# Patient Record
Sex: FEMALE | Race: WHITE | NOT HISPANIC OR LATINO | Employment: PART TIME
[De-identification: names, ages, dates, MRNs, and addresses within clinical notes are randomized per-mention and may not be internally consistent; named-entity substitution may affect disease eponyms.]

---

## 2017-05-03 ENCOUNTER — RECORDS - HEALTHEAST (OUTPATIENT)
Dept: ADMINISTRATIVE | Facility: OTHER | Age: 42
End: 2017-05-03

## 2017-05-03 LAB
BKR LAB AP ABNORMAL BLEEDING: NO
BKR LAB AP BIRTH CONTROL/HORMONES: NORMAL
BKR LAB AP CERVICAL APPEARANCE: NORMAL
BKR LAB AP GYN ADEQUACY: NORMAL
BKR LAB AP GYN INTERPRETATION: NORMAL
BKR LAB AP HPV REFLEX: NORMAL
BKR LAB AP LMP: NORMAL
BKR LAB AP PATIENT STATUS: NORMAL
BKR LAB AP PREVIOUS ABNORMAL: NORMAL
BKR LAB AP PREVIOUS NORMAL: 2012
HIGH RISK?: NO
PATH REPORT.COMMENTS IMP SPEC: NORMAL
RESULT FLAG (HE HISTORICAL CONVERSION): NORMAL

## 2017-07-08 ENCOUNTER — HEALTH MAINTENANCE LETTER (OUTPATIENT)
Age: 42
End: 2017-07-08

## 2017-10-02 ENCOUNTER — OFFICE VISIT (OUTPATIENT)
Dept: FAMILY MEDICINE | Facility: CLINIC | Age: 42
End: 2017-10-02
Payer: COMMERCIAL

## 2017-10-02 DIAGNOSIS — Z23 NEED FOR PROPHYLACTIC VACCINATION AND INOCULATION AGAINST INFLUENZA: Primary | ICD-10-CM

## 2017-10-02 PROCEDURE — 90471 IMMUNIZATION ADMIN: CPT

## 2017-10-02 PROCEDURE — 90686 IIV4 VACC NO PRSV 0.5 ML IM: CPT

## 2017-10-02 PROCEDURE — 99207 ZZC NO CHARGE NURSE ONLY: CPT

## 2017-10-02 NOTE — PROGRESS NOTES
Injectable Influenza Immunization Documentation    1.  Is the person to be vaccinated sick today?   No    2. Does the person to be vaccinated have an allergy to a component   of the vaccine?   No    3. Has the person to be vaccinated ever had a serious reaction   to influenza vaccine in the past?   No    4. Has the person to be vaccinated ever had Guillain-Barré syndrome?   No    Form completed by Klaudia Munoz CMA

## 2017-10-02 NOTE — MR AVS SNAPSHOT
"              After Visit Summary   10/2/2017    Jefferson Tineo    MRN: [id number]           Patient Information     Date Of Birth          1975        Visit Information        Provider Department      10/2/2017 10:00 AM Jada/Danni, Fl Amara [facility]        Today's Diagnoses     Need for prophylactic vaccination and inoculation against influenza    -  1       Follow-ups after your visit        Your next 10 appointments already scheduled     Oct 02, 2017 10:00 AM CDT   SHORT with Dm Kim   [facility] ([facility])    [address] [address]   [phone]              Who to contact     Normal or non-critical lab and imaging results will be communicated to you by MyChart, letter or phone within 4 business days after the clinic has received the results. If you do not hear from us within 7 days, please contact the clinic through MyChart or phone. If you have a critical or abnormal lab result, we will notify you by phone as soon as possible.  Submit refill requests through [employer] or call your pharmacy and they will forward the refill request to us. Please allow 3 business days for your refill to be completed.          If you need to speak with a  for additional information , please call: [phone]           Additional Information About Your Visit        [employer] Information     [employer] lets you send messages to your doctor, view your test results, renew your prescriptions, schedule appointments and more. To sign up, go to [email or web address] . Click on \"Log in\" on the left side of the screen, which will take you to the Welcome page. Then click on \"Sign up Now\" on the right side of the page.     You will be asked to enter the access code listed below, as well as some personal information. Please follow the directions to create your username and password.     Your access code is: [email or web address]  Expires: 12/31/2017  " 9:51 AM     Your access code will  in 90 days. If you need help or a new code, please call your [place] clinic or [phone].        Care EveryWhere ID     This is your Care EveryWhere ID. This could be used by other organizations to access your [place] medical records  [id number]         Blood Pressure from Last 3 Encounters:   16 92/63   16 98/55   09/16/15 102/70    Weight from Last 3 Encounters:   16 154 lb (69.9 kg)   09/16/15 153 lb (69.4 kg)   02/06/15 149 lb (67.6 kg)              We Performed the Following     FLU VAC, SPLIT VIRUS IM > 3 YO (QUADRIVALENT) [88868]     Vaccine Administration, Initial [95568]        Primary Care Provider Office Phone # Fax #    Teena Pandey [phone] [address] [facility] [address]        Equal Access to Services     [facility]: Hadii aad ku hadasho Soomaali, waaxda luqadaha, qaybta kaalmada adeegyada, waxay idiin haynazaninn francesco guthrie . So [facility] [phone].    ATENCIÓN: Si habla español, tiene a sibley disposición servicios gratuitos de asistencia lingüística. Llame al [phone].    We comply with applicable federal civil rights laws and [place] laws. We do not discriminate on the basis of race, color, national origin, age, disability, sex, sexual orientation, or gender identity.            Thank you!     Thank you for choosing [facility]  for your care. Our goal is always to provide you with excellent care. Hearing back from our patients is one way we can continue to improve our services. Please take a few minutes to complete the written survey that you may receive in the mail after your visit with us. Thank you!             Your Updated Medication List - Protect others around you: Learn how to safely use, store and throw away your medicines at www.disposemymeds.org.          This list is accurate as of: 10/2/17  9:51 AM.  Always use your most recent med list.                    Brand Name Dispense Instructions for use Diagnosis    VITAMIN D3 PO      Take 2,000 Units by mouth every other day

## 2018-09-12 ENCOUNTER — RECORDS - HEALTHEAST (OUTPATIENT)
Dept: LAB | Facility: CLINIC | Age: 43
End: 2018-09-12

## 2018-09-12 LAB
ALBUMIN SERPL-MCNC: 3.9 G/DL (ref 3.5–5)
ALP SERPL-CCNC: 40 U/L (ref 45–120)
ALT SERPL W P-5'-P-CCNC: 13 U/L (ref 0–45)
ANION GAP SERPL CALCULATED.3IONS-SCNC: 6 MMOL/L (ref 5–18)
AST SERPL W P-5'-P-CCNC: 14 U/L (ref 0–40)
BILIRUB SERPL-MCNC: 0.6 MG/DL (ref 0–1)
BUN SERPL-MCNC: 13 MG/DL (ref 8–22)
CALCIUM SERPL-MCNC: 9.8 MG/DL (ref 8.5–10.5)
CHLORIDE BLD-SCNC: 109 MMOL/L (ref 98–107)
CHOLEST SERPL-MCNC: 181 MG/DL
CO2 SERPL-SCNC: 26 MMOL/L (ref 22–31)
CREAT SERPL-MCNC: 0.77 MG/DL (ref 0.6–1.1)
ERYTHROCYTE [DISTWIDTH] IN BLOOD BY AUTOMATED COUNT: 13.4 % (ref 11–14.5)
FASTING STATUS PATIENT QL REPORTED: NORMAL
GFR SERPL CREATININE-BSD FRML MDRD: >60 ML/MIN/1.73M2
GLUCOSE BLD-MCNC: 76 MG/DL (ref 70–125)
HCT VFR BLD AUTO: 42.3 % (ref 35–47)
HDLC SERPL-MCNC: 78 MG/DL
HGB BLD-MCNC: 13.5 G/DL (ref 12–16)
LDLC SERPL CALC-MCNC: 89 MG/DL
MCH RBC QN AUTO: 29 PG (ref 27–34)
MCHC RBC AUTO-ENTMCNC: 31.9 G/DL (ref 32–36)
MCV RBC AUTO: 91 FL (ref 80–100)
PLATELET # BLD AUTO: 184 THOU/UL (ref 140–440)
PMV BLD AUTO: 11.3 FL (ref 8.5–12.5)
POTASSIUM BLD-SCNC: 4.5 MMOL/L (ref 3.5–5)
PROT SERPL-MCNC: 6.5 G/DL (ref 6–8)
RBC # BLD AUTO: 4.66 MILL/UL (ref 3.8–5.4)
SODIUM SERPL-SCNC: 141 MMOL/L (ref 136–145)
TRIGL SERPL-MCNC: 69 MG/DL
TSH SERPL DL<=0.005 MIU/L-ACNC: 1.39 UIU/ML (ref 0.3–5)
WBC: 4.6 THOU/UL (ref 4–11)

## 2018-10-31 ENCOUNTER — OFFICE VISIT (OUTPATIENT)
Dept: FAMILY MEDICINE | Facility: CLINIC | Age: 43
End: 2018-10-31
Payer: COMMERCIAL

## 2018-10-31 DIAGNOSIS — Z23 NEED FOR PROPHYLACTIC VACCINATION AND INOCULATION AGAINST INFLUENZA: Primary | ICD-10-CM

## 2018-10-31 PROCEDURE — 90471 IMMUNIZATION ADMIN: CPT

## 2018-10-31 PROCEDURE — 99207 ZZC NO CHARGE NURSE ONLY: CPT

## 2018-10-31 PROCEDURE — 90686 IIV4 VACC NO PRSV 0.5 ML IM: CPT

## 2018-10-31 NOTE — MR AVS SNAPSHOT
"              After Visit Summary   10/31/2018    Jefferson Tineo    MRN: [id number]           Patient Information     Date Of Birth          1975        Visit Information        Provider Department      10/31/2018 3:45 PM Cma/Lpn, Fl Ll [facility]        Today's Diagnoses     Need for prophylactic vaccination and inoculation against influenza    -  1       Follow-ups after your visit        Your next 10 appointments already scheduled     Oct 31, 2018  3:45 PM CDT   SHORT with Fl Ll Cma/Lpn   [facility]    [address] [address]   [phone]              Who to contact     Normal or non-critical lab and imaging results will be communicated to you by MyChart, letter or phone within 4 business days after the clinic has received the results. If you do not hear from us within 7 days, please contact the clinic through MyChart or phone. If you have a critical or abnormal lab result, we will notify you by phone as soon as possible.  Submit refill requests through [employer] or call your pharmacy and they will forward the refill request to us. Please allow 3 business days for your refill to be completed.          If you need to speak with a  for additional information , please call: [phone]           Additional Information About Your Visit        [employer] Information     [employer] lets you send messages to your doctor, view your test results, renew your prescriptions, schedule appointments and more. To sign up, go to [email or web address] . Click on \"Log in\" on the left side of the screen, which will take you to the Welcome page. Then click on \"Sign up Now\" on the right side of the page.     You will be asked to enter the access code listed below, as well as some personal information. Please follow the directions to create your username and password.     Your access code is: [email or web address]  Expires: 1/29/2019  " 2:14 PM     Your access code will  in 90 days. If you need help or a new code, please call your [place] clinic or [phone].        Care EveryWhere ID     This is your Care EveryWhere ID. This could be used by other organizations to access your [place] medical records  [id number]         Blood Pressure from Last 3 Encounters:   16 92/63   16 98/55   09/16/15 102/70    Weight from Last 3 Encounters:   16 154 lb (69.9 kg)   09/16/15 153 lb (69.4 kg)   02/06/15 149 lb (67.6 kg)              We Performed the Following     FLU VACCINE, SPLIT VIRUS, IM (QUADRIVALENT) [28443]- >3 YRS     Vaccine Administration, Initial [[id number]]        Primary Care Provider Office Phone # Fax #    Teena Pandey [phone] [address] [facility] [address]        Equal Access to Services     [facility]: Hadii aad ku hadasho Soomaali, waaxda luqadaha, qaybta kaalmada adeegyada, waxay idiin haynazaninn francesco guthrie . So [place] [phone].    ATENCIÓN: Si habla español, tiene a sibley disposición servicios gratuitos de asistencia lingüística. Llame al [phone].    We comply with applicable federal civil rights laws and [place] laws. We do not discriminate on the basis of race, color, national origin, age, disability, sex, sexual orientation, or gender identity.            Thank you!     Thank you for choosing [facility]  for your care. Our goal is always to provide you with excellent care. Hearing back from our patients is one way we can continue to improve our services. Please take a few minutes to complete the written survey that you may receive in the mail after your visit with us. Thank you!             Your Updated Medication List - Protect others around you: Learn how to safely use, store and throw away your medicines at www.disposemymeds.org.          This list is accurate as of 10/31/18  2:14 PM.  Always use your most recent med list.                    Brand Name Dispense Instructions for use Diagnosis    VITAMIN D3 PO      Take 2,000 Units by mouth every other day

## 2019-11-03 ENCOUNTER — HEALTH MAINTENANCE LETTER (OUTPATIENT)
Age: 44
End: 2019-11-03

## 2020-11-16 ENCOUNTER — HEALTH MAINTENANCE LETTER (OUTPATIENT)
Age: 45
End: 2020-11-16

## 2021-03-10 ENCOUNTER — RECORDS - HEALTHEAST (OUTPATIENT)
Dept: ADMINISTRATIVE | Facility: OTHER | Age: 46
End: 2021-03-10

## 2021-03-10 LAB
BKR LAB AP ABNORMAL BLEEDING: NO
BKR LAB AP BIRTH CONTROL/HORMONES: NORMAL
BKR LAB AP CERVICAL APPEARANCE: NORMAL
BKR LAB AP GYN ADEQUACY: NORMAL
BKR LAB AP GYN INTERPRETATION: NORMAL
BKR LAB AP HPV REFLEX: NORMAL
BKR LAB AP LMP: NORMAL
BKR LAB AP PATIENT STATUS: NORMAL
BKR LAB AP PREVIOUS ABNORMAL: NO
BKR LAB AP PREVIOUS NORMAL: NORMAL
HIGH RISK?: NO
PATH REPORT.COMMENTS IMP SPEC: NORMAL
RESULT FLAG (HE HISTORICAL CONVERSION): NORMAL

## 2021-04-02 ENCOUNTER — IMMUNIZATION (OUTPATIENT)
Dept: NURSING | Facility: CLINIC | Age: 46
End: 2021-04-02
Payer: COMMERCIAL

## 2021-04-02 PROCEDURE — 0001A PR COVID VAC PFIZER DIL RECON 30 MCG/0.3 ML IM: CPT

## 2021-04-02 PROCEDURE — 91300 PR COVID VAC PFIZER DIL RECON 30 MCG/0.3 ML IM: CPT

## 2021-04-23 ENCOUNTER — IMMUNIZATION (OUTPATIENT)
Dept: NURSING | Facility: CLINIC | Age: 46
End: 2021-04-23
Attending: INTERNAL MEDICINE
Payer: COMMERCIAL

## 2021-04-23 PROCEDURE — 0002A PR COVID VAC PFIZER DIL RECON 30 MCG/0.3 ML IM: CPT

## 2021-04-23 PROCEDURE — 91300 PR COVID VAC PFIZER DIL RECON 30 MCG/0.3 ML IM: CPT

## 2021-09-12 ENCOUNTER — HEALTH MAINTENANCE LETTER (OUTPATIENT)
Age: 46
End: 2021-09-12

## 2021-09-15 ENCOUNTER — APPOINTMENT (OUTPATIENT)
Dept: GENERAL RADIOLOGY | Facility: CLINIC | Age: 46
End: 2021-09-15
Attending: PHYSICIAN ASSISTANT
Payer: COMMERCIAL

## 2021-09-15 ENCOUNTER — HOSPITAL ENCOUNTER (EMERGENCY)
Facility: CLINIC | Age: 46
Discharge: HOME | End: 2021-09-15
Attending: PHYSICIAN ASSISTANT | Admitting: PHYSICIAN ASSISTANT
Payer: COMMERCIAL

## 2021-09-15 VITALS
BODY MASS INDEX: 23.52 KG/M2 | RESPIRATION RATE: 16 BRPM | WEIGHT: 137 LBS | OXYGEN SATURATION: 97 % | DIASTOLIC BLOOD PRESSURE: 83 MMHG | TEMPERATURE: 97 F | SYSTOLIC BLOOD PRESSURE: 125 MMHG | HEART RATE: 104 BPM

## 2021-09-15 DIAGNOSIS — S93.401A RIGHT ANKLE SPRAIN: ICD-10-CM

## 2021-09-15 DIAGNOSIS — S99.911A ANKLE INJURY, RIGHT, INITIAL ENCOUNTER: ICD-10-CM

## 2021-09-15 PROCEDURE — 99213 OFFICE O/P EST LOW 20 MIN: CPT | Performed by: PHYSICIAN ASSISTANT

## 2021-09-15 PROCEDURE — G0463 HOSPITAL OUTPT CLINIC VISIT: HCPCS | Performed by: PHYSICIAN ASSISTANT

## 2021-09-15 PROCEDURE — 73630 X-RAY EXAM OF FOOT: CPT | Mod: RT

## 2021-09-15 PROCEDURE — 73610 X-RAY EXAM OF ANKLE: CPT | Mod: RT

## 2021-09-15 ASSESSMENT — ENCOUNTER SYMPTOMS
NUMBNESS: 0
WEAKNESS: 0

## 2021-09-16 NOTE — ED TRIAGE NOTES
Walking her dog, dog pulled and foot stayed planted while right leg went laterally  C/o right ankle pain

## 2021-09-16 NOTE — ED PROVIDER NOTES
History     Chief Complaint   Patient presents with     Ankle Pain     HPI    Jefferson Tineo is a 46 year old female who sustained a right ankle injury 6 days ago.   Mechanism of injury: twisted.   Immediate symptoms: immediate pain, immediate swelling, was able to bear weight directly after injury, no deformity was noted by the patient.   Symptoms have been sudden since that time.   Prior history of related problems: Positive ankle sprains in the past but no surgical treatment or fractures.  Patient states lateral ankle and foot pain with bruising, swelling.  No knee pain.  No numbness or tingling.  No skin abrasions or lacerations.  No redness or decreased range of motion in the ankle.    Patient has been icing, elevating, Ace wrap and resting with minimal improvement.  She has not taken any Tylenol or ibuprofen over-the-counter for the pain.    Problem list, Medication list, Allergies, and Medical/Social/Surgical histories reviewed in EPIC and updated as appropriate.      Allergies:  No Known Allergies    Problem List:    Patient Active Problem List    Diagnosis Date Noted     Surveillance of previously prescribed intrauterine contraceptive device 11/22/2011     Priority: Medium     Mild recurrent major depression (H) 05/03/2011     Priority: Medium     CARDIOVASCULAR SCREENING; LDL GOAL LESS THAN 160 10/31/2010     Priority: Medium     Mood change 08/13/2008     Priority: Medium     External hemorrhoids 01/22/2008     Priority: Medium     Problem list name updated by automated process. Provider to review       Generalized anxiety disorder 07/30/2007     Priority: Medium     Stable without med 10/08  tried Zoloft, Celexa, Cymbalta and Paxil. Zoloft seemed to increase her suicidal ideation  4/09 restarted meds       Encounter for other general counseling or advice on contraception 07/24/2007     Priority: Medium     Diagnosis updated by automated process. Provider to review and confirm.       Tachycardia       Priority: Medium     Negative EKG, Holter, ECHO  Problem list name updated by automated process. Provider to review       Bell's palsy 09/03/2006     Priority: Medium     WRIST PAIN 05/05/2005     Priority: Medium     May 5, 2005 - RECENT ONSET ON WRIST PAIN W/O ETIOLOGY OR INJURY.  NO OTHER JOINTS INVOLVED. NEGATIVE EXAM AND XRAY.  REASSURRANCE AND MONITOR.  SPLINT GIVEN.  ANTIINFLAMMATORY DOSES OF NSAIDS.  If new, worsening or persistent symptoms, the patient is to call or return for a recheck.         Health Care Home 08/14/2013     Priority: Low     EMERGENCY CARE PLAN  August 14, 2013: No current Care Coordination follow up planned. Please refer if Care Coordination services are needed.    Presenting Problem Signs and Symptoms Treatment Plan   Questions or concerns   during clinic hours   I will call my clinic directly:  [address] [address]  [phone].    Questions or concerns outside clinic hours   I will call the 24 hour nurse line at   [phone] or [phone].   Need to schedule an appointment   I will call the 24 hour scheduling team at [phone] or my clinic directly at [phone].    Same day treatment     I will call my clinic first, nurse line if after hours, urgent care and express care if needed.   Clinic care coordination services (regular clinic hours)     I will call a clinic care coordinator directly:     Jose A Moy RN  Mon, Tues, Fri - [phone]  Wed, Thurs - [phone]    Mireille Naylor :    [phone]    Or call my clinic at [phone] and ask to speak with care coordination.   Crisis Services: Behavioral or Mental Health  [facility] 24 Hour Phone Line  [phone]    [facility] 24 Hour Crisis Services  [phone]    [facility] (Behavioral Health Providers) Network [phone]    [facility]   [phone]       Emergency treatment -- Immediately    CAll 911             Past Medical History:    Past  Medical History:   Diagnosis Date     Depressive disorder      Female infertility of unspecified origin      Unspecified spontaneous  without mention of complication        Past Surgical History:    Past Surgical History:   Procedure Laterality Date     APPENDECTOMY       Breast biopsy-benign  1999     BREAST SURGERY       GYN SURGERY       LAPAROSCOPIC APPENDECTOMY N/A 2016    Procedure: LAPAROSCOPIC APPENDECTOMY;  Surgeon: Mikael Weaver MD;  Location: [facility] OR     Left salpingo-oophorectomy, L 7cm ovarian cyst  2000     TONSILLECTOMY  1986       Family History:    Family History   Problem Relation Age of Onset     Alcohol/Drug Mother         alcohol     Depression Mother      Heart Disease Paternal Grandfather         CHF     Hypertension Paternal Grandfather      Cancer Paternal Grandfather      Prostate Cancer Paternal Grandfather      Diabetes Paternal Grandfather         Type II     Gastrointestinal Disease Father         ulcer     Hypertension Father      Depression Father      Respiratory Father 62        emphesma     Hyperlipidemia Father      Other Cancer Father      C.A.D. Paternal Grandmother          77MI     Depression Paternal Grandmother      Alzheimer Disease Paternal Grandmother      Depression Sister      C.A.D. Maternal Grandmother         MI     Diabetes Maternal Grandmother      Depression Sister      Heart Disease Paternal Aunt         P Uncles and P cousins with SVT, and structural heart disease     Asthma Nephew        Social History:  Marital Status:  Single [1]  Social History     Tobacco Use     Smoking status: Never Smoker     Smokeless tobacco: Never Used   Substance Use Topics     Alcohol use: Yes     Alcohol/week: 0.0 standard drinks     Drug use: No        Medications:    Cholecalciferol (VITAMIN D3 PO)          Review of Systems   Musculoskeletal:        Right ankle injury.   Skin:        Positive bruising   Neurological: Negative for weakness and  numbness.   All other systems reviewed and are negative.      Physical Exam   BP: 125/83  Pulse: 104  Temp: 97  F (36.1  C)  Resp: 16  Weight: 62.1 kg (137 lb)  SpO2: 97 %      Physical Exam  Vitals and nursing note reviewed.   Constitutional:       General: She is not in acute distress.     Appearance: Normal appearance. She is normal weight. She is not ill-appearing or toxic-appearing.   Cardiovascular:      Pulses: Normal pulses.   Musculoskeletal:         General: Swelling and tenderness present. No deformity.      Left knee: No bony tenderness. No tenderness.      Right lower leg: No edema.      Left lower leg: Normal. No swelling. No edema.      Left ankle: Swelling and ecchymosis present. No deformity or lacerations. Tenderness present over the lateral malleolus and base of 5th metatarsal. No proximal fibula tenderness. Normal range of motion. Anterior drawer test negative. Normal pulse.      Left Achilles Tendon: Normal.      Left foot: Normal capillary refill. Tenderness (Lateral aspect of the fifth metatarsal and foot.) present. No swelling, deformity, bunion, Charcot foot, foot drop, prominent metatarsal heads or crepitus. Normal pulse.      Comments: Neurovascularly intact.  No ligament instability to the right.   Skin:     General: Skin is warm.      Capillary Refill: Capillary refill takes less than 2 seconds.      Findings: Bruising (Over lateral malleolus of the right ankle.) present.   Neurological:      General: No focal deficit present.      Mental Status: She is alert and oriented to person, place, and time.      Sensory: No sensory deficit.      Motor: No weakness.      Gait: Gait normal.   Psychiatric:         Mood and Affect: Mood normal.         Behavior: Behavior normal.         Thought Content: Thought content normal.         Judgment: Judgment normal.         ED Course        Procedures             Critical Care time:  none               Results for orders placed or performed during the  hospital encounter of 09/15/21 (from the past 24 hour(s))   Foot  XR, G/E 3 views, right    Narrative    EXAM: XR FOOT RIGHT G/E 3 VIEWS, XR ANKLE RIGHT G/E 3 VIEWS  LOCATION: [facility]  DATE/TIME: 9/15/2021 8:58 PM    INDICATION: injury to right and lateral ankle and lateral foot 6 days ago with persistent swelling, bruising and pain.  COMPARISON: None.      Impression    IMPRESSION: Normal joint spaces and alignment. No fracture identified. Lateral ankle soft tissue swelling. Minimal plantar calcaneal spurring.   Ankle XR, G/E 3 views, right    Narrative    EXAM: XR FOOT RIGHT G/E 3 VIEWS, XR ANKLE RIGHT G/E 3 VIEWS  LOCATION: [facility]  DATE/TIME: 9/15/2021 8:58 PM    INDICATION: injury to right and lateral ankle and lateral foot 6 days ago with persistent swelling, bruising and pain.  COMPARISON: None.      Impression    IMPRESSION: Normal joint spaces and alignment. No fracture identified. Lateral ankle soft tissue swelling. Minimal plantar calcaneal spurring.       Medications - No data to display    Assessments & Plan (with Medical Decision Making)     I have reviewed the nursing notes.    I have reviewed the findings, diagnosis, plan and need for follow up with the patient.   46-year-old female presents the urgent care with right ankle injury that occurred few days ago.  See exam findings above.  X-ray obtained and were negative for fractures.  No ligament instability noted.  Positive bruising and tenderness over the lateral aspect of the right ankle likely related to ankle sprain.  Patient placed in Ace wrap and gel ankle splint and offered crutches but declined the crutches tonight.  Patient informed to continue ice, rest, elevate, Tylenol and ibuprofen and to follow-up with orthopedic specialist or primary care doctor in 1 to 2 weeks if symptoms persist or fail to improve.  Patient in agreement this plan and discharged in stable  condition.    Discharge Medication List as of 9/15/2021  9:41 PM          Final diagnoses:   Right ankle sprain   Ankle injury, right, initial encounter       9/15/2021   [facility] EMERGENCY DEPT     Kinjal Rabago PA-C  09/15/21 2150

## 2021-09-16 NOTE — DISCHARGE INSTRUCTIONS
Ice, rest, elevate, Tylenol and ibuprofen over-the-counter as needed.    Follow-up with primary care doctor for recheck in 1 to 2 weeks if symptoms persist or fail to improve.    Wear your Ace wrap and gel ankle splint as needed to help with symptoms.    In the emergency department if pain out of proportion, numbness, redness or warmth or change in symptoms occur.

## 2022-01-13 ENCOUNTER — LAB REQUISITION (OUTPATIENT)
Dept: LAB | Facility: CLINIC | Age: 47
End: 2022-01-13

## 2022-01-13 DIAGNOSIS — L70.0 ACNE VULGARIS: ICD-10-CM

## 2022-01-13 LAB
ALBUMIN SERPL-MCNC: 3.9 G/DL (ref 3.5–5)
ALP SERPL-CCNC: 51 U/L (ref 45–120)
ALT SERPL W P-5'-P-CCNC: 13 U/L (ref 0–45)
ANION GAP SERPL CALCULATED.3IONS-SCNC: 7 MMOL/L (ref 5–18)
AST SERPL W P-5'-P-CCNC: 15 U/L (ref 0–40)
BILIRUB SERPL-MCNC: 0.6 MG/DL (ref 0–1)
BUN SERPL-MCNC: 13 MG/DL (ref 8–22)
CALCIUM SERPL-MCNC: 9.4 MG/DL (ref 8.5–10.5)
CHLORIDE BLD-SCNC: 105 MMOL/L (ref 98–107)
CO2 SERPL-SCNC: 26 MMOL/L (ref 22–31)
CREAT SERPL-MCNC: 0.81 MG/DL (ref 0.6–1.1)
GFR SERPL CREATININE-BSD FRML MDRD: >90 ML/MIN/1.73M2
GLUCOSE BLD-MCNC: 98 MG/DL (ref 70–125)
POTASSIUM BLD-SCNC: 4.4 MMOL/L (ref 3.5–5)
PROT SERPL-MCNC: 6.4 G/DL (ref 6–8)
SODIUM SERPL-SCNC: 138 MMOL/L (ref 136–145)

## 2022-01-13 PROCEDURE — 80053 COMPREHEN METABOLIC PANEL: CPT | Performed by: FAMILY MEDICINE

## 2022-02-27 ENCOUNTER — HEALTH MAINTENANCE LETTER (OUTPATIENT)
Age: 47
End: 2022-02-27

## 2022-08-14 ENCOUNTER — HEALTH MAINTENANCE LETTER (OUTPATIENT)
Age: 47
End: 2022-08-14

## 2022-08-19 ENCOUNTER — LAB REQUISITION (OUTPATIENT)
Dept: LAB | Facility: CLINIC | Age: 47
End: 2022-08-19

## 2022-08-19 DIAGNOSIS — R53.83 OTHER FATIGUE: ICD-10-CM

## 2022-08-19 LAB
ALBUMIN SERPL BCG-MCNC: 4.5 G/DL (ref 3.5–5.2)
ALP SERPL-CCNC: 43 U/L (ref 35–104)
ALT SERPL W P-5'-P-CCNC: 10 U/L (ref 10–35)
ANION GAP SERPL CALCULATED.3IONS-SCNC: 7 MMOL/L (ref 7–15)
AST SERPL W P-5'-P-CCNC: 16 U/L (ref 10–35)
BILIRUB SERPL-MCNC: 0.4 MG/DL
BUN SERPL-MCNC: 12.5 MG/DL (ref 6–20)
CALCIUM SERPL-MCNC: 9.5 MG/DL (ref 8.6–10)
CHLORIDE SERPL-SCNC: 105 MMOL/L (ref 98–107)
CREAT SERPL-MCNC: 0.78 MG/DL (ref 0.51–0.95)
CRP SERPL-MCNC: <3 MG/L
DEPRECATED HCO3 PLAS-SCNC: 27 MMOL/L (ref 22–29)
ERYTHROCYTE [DISTWIDTH] IN BLOOD BY AUTOMATED COUNT: 13.2 % (ref 10–15)
GFR SERPL CREATININE-BSD FRML MDRD: >90 ML/MIN/1.73M2
GLUCOSE SERPL-MCNC: 87 MG/DL (ref 70–99)
HCT VFR BLD AUTO: 42 % (ref 35–47)
HGB BLD-MCNC: 13.7 G/DL (ref 11.7–15.7)
IRON SATN MFR SERPL: 27 % (ref 15–46)
IRON SERPL-MCNC: 69 UG/DL (ref 35–180)
MCH RBC QN AUTO: 28.8 PG (ref 26.5–33)
MCHC RBC AUTO-ENTMCNC: 32.6 G/DL (ref 31.5–36.5)
MCV RBC AUTO: 88 FL (ref 78–100)
PLATELET # BLD AUTO: 185 10E3/UL (ref 150–450)
POTASSIUM SERPL-SCNC: 4.5 MMOL/L (ref 3.4–5.3)
PROT SERPL-MCNC: 6.5 G/DL (ref 6.4–8.3)
RBC # BLD AUTO: 4.76 10E6/UL (ref 3.8–5.2)
SODIUM SERPL-SCNC: 139 MMOL/L (ref 136–145)
TIBC SERPL-MCNC: 252 UG/DL (ref 240–430)
TSH SERPL DL<=0.005 MIU/L-ACNC: 1.26 UIU/ML (ref 0.3–4.2)
WBC # BLD AUTO: 5.8 10E3/UL (ref 4–11)

## 2022-08-19 PROCEDURE — 84439 ASSAY OF FREE THYROXINE: CPT | Performed by: FAMILY MEDICINE

## 2022-08-19 PROCEDURE — 82306 VITAMIN D 25 HYDROXY: CPT | Performed by: FAMILY MEDICINE

## 2022-08-19 PROCEDURE — 83550 IRON BINDING TEST: CPT | Performed by: FAMILY MEDICINE

## 2022-08-19 PROCEDURE — 80053 COMPREHEN METABOLIC PANEL: CPT | Performed by: FAMILY MEDICINE

## 2022-08-19 PROCEDURE — 82607 VITAMIN B-12: CPT | Performed by: FAMILY MEDICINE

## 2022-08-19 PROCEDURE — 82746 ASSAY OF FOLIC ACID SERUM: CPT | Performed by: FAMILY MEDICINE

## 2022-08-19 PROCEDURE — 86140 C-REACTIVE PROTEIN: CPT | Performed by: FAMILY MEDICINE

## 2022-08-19 PROCEDURE — 85014 HEMATOCRIT: CPT | Performed by: FAMILY MEDICINE

## 2022-08-19 PROCEDURE — 84443 ASSAY THYROID STIM HORMONE: CPT | Performed by: FAMILY MEDICINE

## 2022-08-20 LAB
DEPRECATED CALCIDIOL+CALCIFEROL SERPL-MC: 47 UG/L (ref 20–75)
FOLATE SERPL-MCNC: 6.6 NG/ML (ref 4.6–34.8)
T4 FREE SERPL-MCNC: 1.24 NG/DL (ref 0.9–1.7)
VIT B12 SERPL-MCNC: 472 PG/ML (ref 232–1245)

## 2022-11-19 ENCOUNTER — HEALTH MAINTENANCE LETTER (OUTPATIENT)
Age: 47
End: 2022-11-19

## 2022-12-27 ENCOUNTER — LAB (OUTPATIENT)
Dept: LAB | Facility: OTHER | Age: 47
End: 2022-12-27
Payer: COMMERCIAL

## 2022-12-27 ENCOUNTER — DOCUMENTATION ONLY (OUTPATIENT)
Dept: LAB | Facility: OTHER | Age: 47
End: 2022-12-27

## 2022-12-27 DIAGNOSIS — Z11.1 SCREENING EXAMINATION FOR PULMONARY TUBERCULOSIS: Primary | ICD-10-CM

## 2022-12-27 PROCEDURE — 36415 COLL VENOUS BLD VENIPUNCTURE: CPT

## 2022-12-27 PROCEDURE — 86481 TB AG RESPONSE T-CELL SUSP: CPT

## 2022-12-27 NOTE — PROGRESS NOTES
Patient id coming in for labs at noon today for a a QTB  Please place orders if needed   thank you  Pamela MUNROE) [facility]

## 2022-12-29 LAB
GAMMA INTERFERON BACKGROUND BLD IA-ACNC: 0.01 IU/ML
M TB IFN-G BLD-IMP: NEGATIVE
M TB IFN-G CD4+ BCKGRND COR BLD-ACNC: 9.99 IU/ML
MITOGEN IGNF BCKGRD COR BLD-ACNC: 0 IU/ML
MITOGEN IGNF BCKGRD COR BLD-ACNC: 0.01 IU/ML
QUANTIFERON MITOGEN: 10 IU/ML
QUANTIFERON NIL TUBE: 0.01 IU/ML
QUANTIFERON TB1 TUBE: 0.01 IU/ML
QUANTIFERON TB2 TUBE: 0.02

## 2023-01-04 ENCOUNTER — LAB (OUTPATIENT)
Dept: LAB | Facility: OTHER | Age: 48
End: 2023-01-04
Payer: COMMERCIAL

## 2023-01-04 DIAGNOSIS — Z23 VACCINE FOR VIRAL HEPATITIS: Primary | ICD-10-CM

## 2023-01-04 DIAGNOSIS — Z23 VACCINE FOR VIRAL HEPATITIS: ICD-10-CM

## 2023-01-04 PROCEDURE — 87340 HEPATITIS B SURFACE AG IA: CPT

## 2023-01-04 PROCEDURE — 36415 COLL VENOUS BLD VENIPUNCTURE: CPT

## 2023-01-05 LAB — HBV SURFACE AG SERPL QL IA: NONREACTIVE

## 2023-02-13 ENCOUNTER — LAB REQUISITION (OUTPATIENT)
Dept: LAB | Facility: CLINIC | Age: 48
End: 2023-02-13
Payer: COMMERCIAL

## 2023-02-13 DIAGNOSIS — F98.8 OTHER SPECIFIED BEHAVIORAL AND EMOTIONAL DISORDERS WITH ONSET USUALLY OCCURRING IN CHILDHOOD AND ADOLESCENCE: ICD-10-CM

## 2023-02-13 DIAGNOSIS — R53.83 OTHER FATIGUE: ICD-10-CM

## 2023-02-13 DIAGNOSIS — Z13.6 ENCOUNTER FOR SCREENING FOR CARDIOVASCULAR DISORDERS: ICD-10-CM

## 2023-02-13 LAB
ALBUMIN SERPL BCG-MCNC: 4.4 G/DL (ref 3.5–5.2)
ALP SERPL-CCNC: 43 U/L (ref 35–104)
ALT SERPL W P-5'-P-CCNC: 16 U/L (ref 10–35)
ANION GAP SERPL CALCULATED.3IONS-SCNC: 11 MMOL/L (ref 7–15)
AST SERPL W P-5'-P-CCNC: 16 U/L (ref 10–35)
BILIRUB SERPL-MCNC: 0.3 MG/DL
BUN SERPL-MCNC: 12.7 MG/DL (ref 6–20)
CALCIUM SERPL-MCNC: 9.6 MG/DL (ref 8.6–10)
CHLORIDE SERPL-SCNC: 103 MMOL/L (ref 98–107)
CHOLEST SERPL-MCNC: 193 MG/DL
CREAT SERPL-MCNC: 0.66 MG/DL (ref 0.51–0.95)
DEPRECATED HCO3 PLAS-SCNC: 25 MMOL/L (ref 22–29)
ERYTHROCYTE [DISTWIDTH] IN BLOOD BY AUTOMATED COUNT: 13.5 % (ref 10–15)
GFR SERPL CREATININE-BSD FRML MDRD: >90 ML/MIN/1.73M2
GLUCOSE SERPL-MCNC: 91 MG/DL (ref 70–99)
HCT VFR BLD AUTO: 43.1 % (ref 35–47)
HDLC SERPL-MCNC: 78 MG/DL
HGB BLD-MCNC: 13.7 G/DL (ref 11.7–15.7)
LDLC SERPL CALC-MCNC: 102 MG/DL
MCH RBC QN AUTO: 28.9 PG (ref 26.5–33)
MCHC RBC AUTO-ENTMCNC: 31.8 G/DL (ref 31.5–36.5)
MCV RBC AUTO: 91 FL (ref 78–100)
NONHDLC SERPL-MCNC: 115 MG/DL
PLATELET # BLD AUTO: 193 10E3/UL (ref 150–450)
POTASSIUM SERPL-SCNC: 4 MMOL/L (ref 3.4–5.3)
PROT SERPL-MCNC: 6.5 G/DL (ref 6.4–8.3)
RBC # BLD AUTO: 4.74 10E6/UL (ref 3.8–5.2)
SODIUM SERPL-SCNC: 139 MMOL/L (ref 136–145)
TRIGL SERPL-MCNC: 66 MG/DL
WBC # BLD AUTO: 5.7 10E3/UL (ref 4–11)

## 2023-02-13 PROCEDURE — 86706 HEP B SURFACE ANTIBODY: CPT | Mod: ORL | Performed by: FAMILY MEDICINE

## 2023-02-13 PROCEDURE — 82306 VITAMIN D 25 HYDROXY: CPT | Mod: ORL | Performed by: FAMILY MEDICINE

## 2023-02-13 PROCEDURE — 85027 COMPLETE CBC AUTOMATED: CPT | Mod: ORL | Performed by: FAMILY MEDICINE

## 2023-02-13 PROCEDURE — 80053 COMPREHEN METABOLIC PANEL: CPT | Mod: ORL | Performed by: FAMILY MEDICINE

## 2023-02-13 PROCEDURE — 80061 LIPID PANEL: CPT | Mod: ORL | Performed by: FAMILY MEDICINE

## 2023-02-14 ENCOUNTER — LAB REQUISITION (OUTPATIENT)
Dept: LAB | Facility: CLINIC | Age: 48
End: 2023-02-14
Payer: COMMERCIAL

## 2023-02-14 DIAGNOSIS — Z78.9 OTHER SPECIFIED HEALTH STATUS: ICD-10-CM

## 2023-02-14 LAB
DEPRECATED CALCIDIOL+CALCIFEROL SERPL-MC: 55 UG/L (ref 20–75)
HBV SURFACE AB SERPL IA-ACNC: 596.27 M[IU]/ML
HBV SURFACE AB SERPL IA-ACNC: REACTIVE M[IU]/ML

## 2023-02-23 ENCOUNTER — TRANSFERRED RECORDS (OUTPATIENT)
Dept: MULTI SPECIALTY CLINIC | Facility: CLINIC | Age: 48
End: 2023-02-23

## 2023-04-09 ENCOUNTER — HEALTH MAINTENANCE LETTER (OUTPATIENT)
Age: 48
End: 2023-04-09

## 2023-09-10 ENCOUNTER — HEALTH MAINTENANCE LETTER (OUTPATIENT)
Age: 48
End: 2023-09-10

## 2023-12-27 ASSESSMENT — PATIENT HEALTH QUESTIONNAIRE - PHQ9
SUM OF ALL RESPONSES TO PHQ QUESTIONS 1-9: 0
SUM OF ALL RESPONSES TO PHQ QUESTIONS 1-9: 0
10. IF YOU CHECKED OFF ANY PROBLEMS, HOW DIFFICULT HAVE THESE PROBLEMS MADE IT FOR YOU TO DO YOUR WORK, TAKE CARE OF THINGS AT HOME, OR GET ALONG WITH OTHER PEOPLE: NOT DIFFICULT AT ALL

## 2023-12-28 ENCOUNTER — OFFICE VISIT (OUTPATIENT)
Dept: FAMILY MEDICINE | Facility: CLINIC | Age: 48
End: 2023-12-28
Payer: COMMERCIAL

## 2023-12-28 ENCOUNTER — ANCILLARY PROCEDURE (OUTPATIENT)
Dept: GENERAL RADIOLOGY | Facility: CLINIC | Age: 48
End: 2023-12-28
Attending: PHYSICIAN ASSISTANT
Payer: COMMERCIAL

## 2023-12-28 VITALS
SYSTOLIC BLOOD PRESSURE: 108 MMHG | RESPIRATION RATE: 19 BRPM | HEIGHT: 64 IN | WEIGHT: 139.4 LBS | DIASTOLIC BLOOD PRESSURE: 68 MMHG | TEMPERATURE: 98.1 F | BODY MASS INDEX: 23.8 KG/M2 | OXYGEN SATURATION: 98 % | HEART RATE: 79 BPM

## 2023-12-28 DIAGNOSIS — M25.561 RIGHT KNEE PAIN, UNSPECIFIED CHRONICITY: Primary | ICD-10-CM

## 2023-12-28 DIAGNOSIS — M25.461 SWELLING OF RIGHT KNEE JOINT: ICD-10-CM

## 2023-12-28 DIAGNOSIS — M25.561 RIGHT KNEE PAIN, UNSPECIFIED CHRONICITY: ICD-10-CM

## 2023-12-28 PROCEDURE — 73562 X-RAY EXAM OF KNEE 3: CPT | Mod: TC | Performed by: RADIOLOGY

## 2023-12-28 PROCEDURE — 99213 OFFICE O/P EST LOW 20 MIN: CPT | Performed by: PHYSICIAN ASSISTANT

## 2023-12-28 RX ORDER — OMEGA-3 FATTY ACIDS/FISH OIL 300-1000MG
CAPSULE ORAL
COMMUNITY

## 2023-12-28 RX ORDER — DEXTROAMPHETAMINE SACCHARATE, AMPHETAMINE ASPARTATE, DEXTROAMPHETAMINE SULFATE AND AMPHETAMINE SULFATE 1.25; 1.25; 1.25; 1.25 MG/1; MG/1; MG/1; MG/1
TABLET ORAL
COMMUNITY
Start: 2023-09-12

## 2023-12-28 RX ORDER — LORAZEPAM 0.5 MG/1
TABLET ORAL
COMMUNITY
Start: 2023-02-17

## 2023-12-28 RX ORDER — DEXTROAMPHETAMINE SACCHARATE, AMPHETAMINE ASPARTATE MONOHYDRATE, DEXTROAMPHETAMINE SULFATE AND AMPHETAMINE SULFATE 7.5; 7.5; 7.5; 7.5 MG/1; MG/1; MG/1; MG/1
CAPSULE, EXTENDED RELEASE ORAL
COMMUNITY

## 2023-12-28 RX ORDER — HYDROXYZINE HYDROCHLORIDE 25 MG/1
TABLET, FILM COATED ORAL
COMMUNITY
Start: 2023-01-21 | End: 2024-01-19

## 2023-12-28 ASSESSMENT — ENCOUNTER SYMPTOMS
FEVER: 0
ARTHRALGIAS: 1
WEAKNESS: 0
NUMBNESS: 0
PARESTHESIAS: 0

## 2023-12-28 ASSESSMENT — PAIN SCALES - GENERAL: PAINLEVEL: MILD PAIN (2)

## 2023-12-28 NOTE — PROGRESS NOTES
Assessment & Plan     Right knee pain, unspecified chronicity  Swelling of right knee joint  Patient is a 48-year-old female who presents to clinic due to right knee pain and swelling ongoing for 4 months that has not improved with conservative measures including ibuprofen and activity modification.  No prior surgery or injury.  Vital signs are normal.  Physical exam findings are concerning for possible posterior medial meniscus tear and/or osteochondral defect.  Will complete x-ray for further evaluation.  If x-ray without clear cause, will proceed with MRI.  Discussed home treatment with ice, Tylenol/ibuprofen.  Will base next steps on imaging results.  - XR Knee Right 3 Views; Future       See Patient Instructions    FRITZ Massey [facility] TAMARA Paulino is a 48 year old, presenting for the following health issues:  Musculoskeletal Problem      History of Present Illness       Reason for visit:  Right medial knee pain  Symptom onset:  More than a month  Symptoms include:  Pain and swelling  Symptom intensity:  Moderate  Symptom progression:  Worsening  Had these symptoms before:  No  What makes it worse:  Standing, kneeling, bending  What makes it better:  Rest, elevation and taking ibuprofen    She eats 2-3 servings of fruits and vegetables daily.She consumes 2 sweetened beverage(s) daily.She exercises with enough effort to increase her heart rate 9 or less minutes per day.  She exercises with enough effort to increase her heart rate 3 or less days per week.   She is taking medications regularly.       Patient notes right knee pain and swelling ongoing for 4 months that started without injury.  Pain located to medial aspect and is worse with stairs, squatting, bending.  No prior injuries or surgeries.  Patient has been using activity modification as well as ibuprofen for management.  Symptoms have not improved.      Review of Systems   Constitutional:  Negative for fever.  "  Musculoskeletal:  Positive for arthralgias.   Neurological:  Negative for weakness, numbness and paresthesias.            Objective    /68   Pulse 79   Temp 98.1  F (36.7  C) (Temporal)   Resp 19   Ht 1.626 m (5' 4\")   Wt 63.2 kg (139 lb 6.4 oz)   LMP 12/25/2023 (Exact Date)   SpO2 98%   BMI 23.93 kg/m    Body mass index is 23.93 kg/m .  Physical Exam  Vitals and nursing note reviewed.   Constitutional:       General: She is not in acute distress.     Appearance: Normal appearance.   HENT:      Head: Normocephalic and atraumatic.   Eyes:      Extraocular Movements: Extraocular movements intact.      Pupils: Pupils are equal, round, and reactive to light.   Cardiovascular:      Rate and Rhythm: Normal rate.   Pulmonary:      Effort: Pulmonary effort is normal.   Musculoskeletal:         General: Normal range of motion.      Cervical back: Normal range of motion.      Comments: Right knee: Extension 0, flexion 130.  Tenderness to palpation along posterior medial joint line.  No tenderness with varus or valgus force.  Mild-moderate swelling medially.  No erythema, increased warmth.  Small area of ecchymosis superior to patella.  No patellar apprehension.  Mildly positive Yuni's medially.  No palpable osteophytes.  Normal gait.  Sensation intact.  Left knee: Extension 0, flexion 140   Skin:     General: Skin is warm and dry.   Neurological:      General: No focal deficit present.      Mental Status: She is alert.   Psychiatric:         Mood and Affect: Mood normal.         Behavior: Behavior normal.                              "

## 2023-12-28 NOTE — PATIENT INSTRUCTIONS
Based on your exam, I suspect you have damage to the back part of your medial meniscus such as a meniscus tear.  You could also have damage to the cartilage at this area of your knee.  For further evaluation we are completing an x-ray today.  We will send your x-ray report to [facility].  If your x-ray does not show worrisome findings we will proceed with an MRI of your knee.  For pain relief you can use ice, activity modification and Tylenol/ibuprofen.

## 2023-12-29 ENCOUNTER — HOSPITAL ENCOUNTER (OUTPATIENT)
Dept: MRI IMAGING | Facility: CLINIC | Age: 48
Discharge: HOME | End: 2023-12-29
Attending: PHYSICIAN ASSISTANT | Admitting: PHYSICIAN ASSISTANT
Payer: COMMERCIAL

## 2023-12-29 DIAGNOSIS — M25.561 RIGHT KNEE PAIN, UNSPECIFIED CHRONICITY: ICD-10-CM

## 2023-12-29 DIAGNOSIS — M25.461 SWELLING OF RIGHT KNEE JOINT: ICD-10-CM

## 2023-12-29 DIAGNOSIS — S83.241A TEAR OF MEDIAL MENISCUS OF RIGHT KNEE, CURRENT, UNSPECIFIED TEAR TYPE, INITIAL ENCOUNTER: Primary | ICD-10-CM

## 2023-12-29 PROCEDURE — 73721 MRI JNT OF LWR EXTRE W/O DYE: CPT | Mod: RT

## 2023-12-29 PROCEDURE — 73721 MRI JNT OF LWR EXTRE W/O DYE: CPT | Mod: 26 | Performed by: RADIOLOGY

## 2024-01-19 ENCOUNTER — OFFICE VISIT (OUTPATIENT)
Dept: FAMILY MEDICINE | Facility: CLINIC | Age: 49
End: 2024-01-19
Payer: COMMERCIAL

## 2024-01-19 VITALS
DIASTOLIC BLOOD PRESSURE: 72 MMHG | BODY MASS INDEX: 23.01 KG/M2 | RESPIRATION RATE: 20 BRPM | HEIGHT: 64 IN | TEMPERATURE: 97.8 F | WEIGHT: 134.8 LBS | OXYGEN SATURATION: 98 % | SYSTOLIC BLOOD PRESSURE: 106 MMHG | HEART RATE: 116 BPM

## 2024-01-19 DIAGNOSIS — S83.241D ACUTE MEDIAL MENISCUS TEAR OF RIGHT KNEE, SUBSEQUENT ENCOUNTER: ICD-10-CM

## 2024-01-19 DIAGNOSIS — R00.0 TACHYCARDIA: ICD-10-CM

## 2024-01-19 DIAGNOSIS — Z01.818 PREOP GENERAL PHYSICAL EXAM: Primary | ICD-10-CM

## 2024-01-19 LAB
ERYTHROCYTE [DISTWIDTH] IN BLOOD BY AUTOMATED COUNT: 13 % (ref 10–15)
HCT VFR BLD AUTO: 43 % (ref 35–47)
HGB BLD-MCNC: 13.7 G/DL (ref 11.7–15.7)
MCH RBC QN AUTO: 28.7 PG (ref 26.5–33)
MCHC RBC AUTO-ENTMCNC: 31.9 G/DL (ref 31.5–36.5)
MCV RBC AUTO: 90 FL (ref 78–100)
PLATELET # BLD AUTO: 173 10E3/UL (ref 150–450)
RBC # BLD AUTO: 4.77 10E6/UL (ref 3.8–5.2)
WBC # BLD AUTO: 4.6 10E3/UL (ref 4–11)

## 2024-01-19 PROCEDURE — 99214 OFFICE O/P EST MOD 30 MIN: CPT | Performed by: PHYSICIAN ASSISTANT

## 2024-01-19 PROCEDURE — 36415 COLL VENOUS BLD VENIPUNCTURE: CPT | Performed by: PHYSICIAN ASSISTANT

## 2024-01-19 PROCEDURE — 80048 BASIC METABOLIC PNL TOTAL CA: CPT | Performed by: PHYSICIAN ASSISTANT

## 2024-01-19 PROCEDURE — 85027 COMPLETE CBC AUTOMATED: CPT | Performed by: PHYSICIAN ASSISTANT

## 2024-01-19 ASSESSMENT — PAIN SCALES - GENERAL: PAINLEVEL: NO PAIN (0)

## 2024-01-19 NOTE — PATIENT INSTRUCTIONS
Preparing for Your Surgery  Getting started  A nurse will call you to review your health history and instructions. They will give you an arrival time based on your scheduled surgery time. Please be ready to share:  Your doctor's clinic name and phone number  Your medical, surgical, and anesthesia history  A list of allergies and sensitivities  A list of medicines, including herbal treatments and over-the-counter drugs  Whether the patient has a legal guardian (ask how to send us the papers in advance)  Please tell us if you're pregnant--or if there's any chance you might be pregnant. Some surgeries may injure a fetus (unborn baby), so they require a pregnancy test. Surgeries that are safe for a fetus don't always need a test, and you can choose whether to have one.   If you have a child who's having surgery, please ask for a copy of Preparing for Your Child's Surgery.    Preparing for surgery  Within 10 to 30 days of surgery: Have a pre-op exam (sometimes called an H&P, or History and Physical). This can be done at a clinic or pre-operative center.  If you're having a , you may not need this exam. Talk to your care team.  At your pre-op exam, talk to your care team about all medicines you take. If you need to stop any medicines before surgery, ask when to start taking them again.  We do this for your safety. Many medicines can make you bleed too much during surgery. Some change how well surgery (anesthesia) drugs work.  Call your insurance company to let them know you're having surgery. (If you don't have insurance, call [phone].)  Call your clinic if there's any change in your health. This includes signs of a cold or flu (sore throat, runny nose, cough, rash, fever). It also includes a scrape or scratch near the surgery site.  If you have questions on the day of surgery, call your hospital or surgery center.  Eating and drinking guidelines  For your safety: Unless your surgeon tells you otherwise,  follow the guidelines below.  Eat and drink as usual until 8 hours before you arrive for surgery. After that, no food or milk.  Drink clear liquids until 2 hours before you arrive. These are liquids you can see through, like water, Gatorade, and Propel Water. They also include plain black coffee and tea (no cream or milk), candy, and breath mints. You can spit out gum when you arrive.  If you drink alcohol: Stop drinking it the night before surgery.  If your care team tells you to take medicine on the morning of surgery, it's okay to take it with a sip of water.  Preventing infection  Shower or bathe the night before and morning of your surgery. Follow the instructions your clinic gave you. (If no instructions, use regular soap.)  Don't shave or clip hair near your surgery site. We'll remove the hair if needed.  Don't smoke or vape the morning of surgery. You may chew nicotine gum up to 2 hours before surgery. A nicotine patch is okay.  Note: Some surgeries require you to completely quit smoking and nicotine. Check with your surgeon.  Your care team will make every effort to keep you safe from infection. We will:  Clean our hands often with soap and water (or an alcohol-based hand rub).  Clean the skin at your surgery site with a special soap that kills germs.  Give you a special gown to keep you warm. (Cold raises the risk of infection.)  Wear special hair covers, masks, gowns and gloves during surgery.  Give antibiotic medicine, if prescribed. Not all surgeries need antibiotics.  What to bring on the day of surgery  Photo ID and insurance card  Copy of your health care directive, if you have one  Glasses and hearing aids (bring cases)  You can't wear contacts during surgery  Inhaler and eye drops, if you use them (tell us about these when you arrive)  CPAP machine or breathing device, if you use them  A few personal items, if spending the night  If you have . . .  A pacemaker, ICD (cardiac defibrillator) or other  implant: Bring the ID card.  An implanted stimulator: Bring the remote control.  A legal guardian: Bring a copy of the certified (court-stamped) guardianship papers.  Please remove any jewelry, including body piercings. Leave jewelry and other valuables at home.  If you're going home the day of surgery  You must have a responsible adult drive you home. They should stay with you overnight as well.  If you don't have someone to stay with you, and you aren't safe to go home alone, we may keep you overnight. Insurance often won't pay for this.  After surgery  If it's hard to control your pain or you need more pain medicine, please call your surgeon's office.  Questions?   If you have any questions for your care team, list them here: _________________________________________________________________________________________________________________________________________________________________________ ____________________________________ ____________________________________ ____________________________________  For informational purposes only. Not to replace the advice of your health care provider. Copyright   2003, 2019 [facility]. All rights reserved. Clinically reviewed by Betsy Fink MD. [employer] 077575 - REV 12/22.    How to Take Your Medication Before Surgery  - Take all of your medications before surgery except as noted below  - STOP taking all vitamins and herbal supplements 14 days before surgery.  - Do not take ibuprofen within 24 hours of your procedure  -Do not take Adderall the morning of your procedure

## 2024-01-19 NOTE — PROGRESS NOTES
Preoperative Evaluation  [facility] [place]  [address]  [place] [address]  Phone: [phone]  Primary Provider: Alisa Tang  Pre-op Performing Provider: ISIS URRUTIA  Jan 19, 2024       Jefferson is a 48 year old, presenting for the following:  Pre-Op Exam        1/19/2024     7:36 AM   Additional Questions   Roomed by Mayte BALLESTEROS MA   Accompanied by No one         1/19/2024     7:36 AM   Patient Reported Additional Medications   Patient reports taking the following new medications None     Surgical Information  Surgery/Procedure: Arthroscopy-right Knee  Surgery Location: [facility]  Surgeon: Dr. Du Bunch  Surgery Date: 01/29/2024  Time of Surgery: TBD  Where patient plans to recover: At home with family  Fax number for surgical facility: [phone]    Assessment & Plan     The proposed surgical procedure is considered INTERMEDIATE risk.    Preop general physical exam  Acute medial meniscus tear of right knee, subsequent encounter  Patient is a 48-year-old female who presents to clinic for preoperative evaluation.  Patient has scheduled arthroscopy-right knee 1/29/24.  Vital signs significant for tachycardia.  Physical exam without acute abnormalities.  - HCG qualitative urine; Future  - CBC with platelets; Future  - Basic metabolic panel  (Ca, Cl, CO2, Creat, Gluc, K, Na, BUN); Future  - CBC with platelets  - Basic metabolic panel  (Ca, Cl, CO2, Creat, Gluc, K, Na, BUN)  - HCG qualitative urine; Future    Tachycardia  Patient notes tachycardia related to Adderall and caffeine intake and notes this is at baseline for her.  No acute illness/URI.            - No identified additional risk factors other than previously addressed    Antiplatelet or Anticoagulation Medication Instructions   - Patient is on no antiplatelet or anticoagulation medications.    Additional Medication Instructions  Patient is to take all scheduled medications on the day of  surgery EXCEPT for modifications listed below:  Do not take Adderall in the morning of procedure   - ibuprofen (Advil, Motrin): HOLD 1 day before surgery.     Recommendation  APPROVAL GIVEN to proceed with proposed procedure, without further diagnostic evaluation.      Subjective       Via the Health Maintenance questionnaire, the patient has reported the following services have been completed -Colonscopy, this information has been sent to the abstraction team.    HPI related to upcoming procedure: Patient notes right knee pain and swelling ongoing for the past 5 months.  Symptoms did not improve with conservative measures.  MRI was completed showing medial meniscus tear.  Patient was evaluated by orthopedics and has planned right knee arthroscopy        1/18/2024     2:42 PM   Preop Questions   1. Have you ever had a heart attack or stroke? No   2. Have you ever had surgery on your heart or blood vessels, such as a stent placement, a coronary artery bypass, or surgery on an artery in your head, neck, heart, or legs? No   3. Do you have chest pain with activity? No   4. Do you have a history of  heart failure? No   5. Do you currently have a cold, bronchitis or symptoms of other infection? No   6. Do you have a cough, shortness of breath, or wheezing? No   7. Do you or anyone in your family have previous history of blood clots? YES - Dad-provoked    8. Do you or does anyone in your family have a serious bleeding problem such as prolonged bleeding following surgeries or cuts? No   9. Have you ever had problems with anemia or been told to take iron pills? No   10. Have you had any abnormal blood loss such as black, tarry or bloody stools, or abnormal vaginal bleeding? No   11. Have you ever had a blood transfusion? No   12. Are you willing to have a blood transfusion if it is medically needed before, during, or after your surgery? Yes   13. Have you or any of your relatives ever had problems with anesthesia? YES     14. Do you have sleep apnea, excessive snoring or daytime drowsiness? No   15. Do you have any artifical heart valves or other implanted medical devices like a pacemaker, defibrillator, or continuous glucose monitor? No   16. Do you have artificial joints? No   17. Are you allergic to latex? No   18. Is there any chance that you may be pregnant? No       Health Care Directive  Patient does not have a Health Care Directive or Living Will: Patient states has Advance Directive and will bring in a copy to clinic.    Preoperative Review of    reviewed - controlled substances reflected in medication list.      Status of Chronic Conditions:  See problem list for active medical problems.  Problems all longstanding and stable, except as noted/documented.  See ROS for pertinent symptoms related to these conditions.    Patient Active Problem List    Diagnosis Date Noted    Surveillance of previously prescribed intrauterine contraceptive device 11/22/2011     Priority: Medium    Mild recurrent major depression (H24) 05/03/2011     Priority: Medium    CARDIOVASCULAR SCREENING; LDL GOAL LESS THAN 160 10/31/2010     Priority: Medium    Mood change 08/13/2008     Priority: Medium    External hemorrhoids 01/22/2008     Priority: Medium     Problem list name updated by automated process. Provider to review      Generalized anxiety disorder 07/30/2007     Priority: Medium     Stable without med 10/08  tried Zoloft, Celexa, Cymbalta and Paxil. Zoloft seemed to increase her suicidal ideation  4/09 restarted meds      Encounter for other general counseling or advice on contraception 07/24/2007     Priority: Medium     Diagnosis updated by automated process. Provider to review and confirm.      Tachycardia      Priority: Medium     Negative EKG, Holter, ECHO  Problem list name updated by automated process. Provider to review      Bell's palsy 09/03/2006     Priority: Medium    WRIST PAIN 05/05/2005     Priority: Medium     May 5,   - RECENT ONSET ON WRIST PAIN W/O ETIOLOGY OR INJURY.  NO OTHER JOINTS INVOLVED. NEGATIVE EXAM AND XRAY.  REASSURRANCE AND MONITOR.  SPLINT GIVEN.  ANTIINFLAMMATORY DOSES OF NSAIDS.  If new, worsening or persistent symptoms, the patient is to call or return for a recheck.        [facility] 2013     Priority: Low     EMERGENCY CARE PLAN  2013: No current Care Coordination follow up planned. Please refer if Care Coordination services are needed.    Presenting Problem Signs and Symptoms Treatment Plan   Questions or concerns   during clinic hours   I will call my clinic directly:  [address] [address]  [phone].    Questions or concerns outside clinic hours   I will call the 24 hour nurse line at   [phone] or [facility].   Need to schedule an appointment   I will call the 24 hour scheduling team at [phone] or my clinic directly at [phone].    Same day treatment     I will call my clinic first, nurse line if after hours, urgent care and express care if needed.     Clinic care coordination services (regular clinic hours)     I will call a clinic care coordinator directly:     Jose A Moy RN  Mon, Tues, Fri - [phone]  Wed, Th - [phone]    Mireille Naylor :    [phone]    Or call my clinic at [phone] and ask to speak with care coordination.     Crisis Services: Behavioral or Mental Health  [facility] 24 Hour Phone Line  [phone]    [facility] 24 Hour Crisis Services  [phone]    [facility] (Behavioral Health Providers) Network [phone]    [facility]   [phone]         Emergency treatment -- Immediately    [facility] 911         Past Medical History:   Diagnosis Date    Depressive disorder     More anxiety    Female infertility of unspecified origin     Unspecified spontaneous  without mention of complication      Past Surgical History:   Procedure Laterality Date     APPENDECTOMY      Breast biopsy-benign  1999    BREAST SURGERY      GYN SURGERY      LAPAROSCOPIC APPENDECTOMY N/A 2016    Procedure: LAPAROSCOPIC APPENDECTOMY;  Surgeon: Mikael Weaver MD;  Location: [facility] OR    Left salpingo-oophorectomy, L 7cm ovarian cyst  2000    TONSILLECTOMY  1986     Current Outpatient Medications   Medication Sig Dispense Refill    amphetamine-dextroamphetamine (ADDERALL XR) 30 MG 24 hr capsule       amphetamine-dextroamphetamine (ADDERALL) 5 MG tablet       Cholecalciferol (VITAMIN D3 PO) Take 2,000 Units by mouth every other day      ibuprofen (ADVIL/MOTRIN) 200 MG capsule       LORazepam (ATIVAN) 0.5 MG tablet       hydrOXYzine HCl (ATARAX) 25 MG tablet TAKE 1 TABLET BY MOUTH THREE TIMES DAILY AS NEEDED FOR ITCHING         No Known Allergies     Social History     Tobacco Use    Smoking status: Never     Passive exposure: Never    Smokeless tobacco: Never   Substance Use Topics    Alcohol use: Yes     Alcohol/week: 0.0 standard drinks of alcohol     Family History   Problem Relation Age of Onset    Alcohol/Drug Mother         alcohol    Depression Mother     Heart Disease Paternal Grandfather         CHF    Hypertension Paternal Grandfather     Cancer Paternal Grandfather     Prostate Cancer Paternal Grandfather     Diabetes Paternal Grandfather         Type II    Gastrointestinal Disease Father         ulcer    Hypertension Father     Depression Father     Respiratory Father 62        emphesma    Hyperlipidemia Father     Other Cancer Father     C.A.D. Paternal Grandmother          77MI    Depression Paternal Grandmother     Alzheimer Disease Paternal Grandmother     Depression Sister     C.A.D. Maternal Grandmother         MI    Diabetes Maternal Grandmother     Depression Sister     Heart Disease Paternal Aunt         P Uncles and P cousins with SVT, and structural heart disease    Asthma Nephew      History   Drug Use No         Review of Systems    Review of  "Systems  CONSTITUTIONAL: NEGATIVE for fever, chills, change in weight  INTEGUMENTARY/SKIN: NEGATIVE for worrisome rashes, moles or lesions  EYES: NEGATIVE for vision changes or irritation  ENT/MOUTH: NEGATIVE for ear, mouth and throat problems  RESP: NEGATIVE for significant cough or SOB  BREAST: NEGATIVE for masses, tenderness or discharge  CV: NEGATIVE for chest pain, palpitations or peripheral edema  GI: NEGATIVE for nausea, abdominal pain, heartburn, or change in bowel habits  : NEGATIVE for frequency, dysuria, or hematuria  MUSCULOSKELETAL: Right knee pain. Otherwise, NEGATIVE for significant arthralgias or myalgia  NEURO: NEGATIVE for weakness, dizziness or paresthesias  ENDOCRINE: NEGATIVE for temperature intolerance, skin/hair changes  HEME: NEGATIVE for bleeding problems  PSYCHIATRIC: NEGATIVE for changes in mood or affect  Objective    /72   Pulse 116   Temp 97.8  F (36.6  C) (Temporal)   Resp 20   Ht 1.62 m (5' 3.78\")   Wt 61.1 kg (134 lb 12.8 oz)   LMP 12/25/2023 (Exact Date)   SpO2 98%   Breastfeeding No   BMI 23.30 kg/m     Estimated body mass index is 23.3 kg/m  as calculated from the following:    Height as of this encounter: 1.62 m (5' 3.78\").    Weight as of this encounter: 61.1 kg (134 lb 12.8 oz).  Physical Exam  GENERAL: alert and no distress  EYES: Eyes grossly normal to inspection, PERRL and conjunctivae and sclerae normal  HENT: nose and mouth without ulcers or lesions  NECK: no adenopathy, no asymmetry, masses, or scars  RESP: lungs clear to auscultation - no rales, rhonchi or wheezes  CV: regular rate and rhythm, normal S1 S2, no S3 or S4, no murmur, click or rub, no peripheral edema  ABDOMEN: soft, nontender, no hepatosplenomegaly, no masses and bowel sounds normal  MS: no gross musculoskeletal defects noted, no edema  SKIN: no suspicious lesions or rashes  NEURO: Normal strength and tone, mentation intact and speech normal  PSYCH: mentation appears normal, affect " normal/bright  LYMPH: no cervical, supraclavicular, axillary, or inguinal adenopathy    Recent Labs   Lab Test 02/13/23  1110 08/19/22  1146   HGB 13.7 13.7    185    139   POTASSIUM 4.0 4.5   CR 0.66 0.78        Diagnostics  Results for orders placed or performed in visit on 01/19/24   CBC with platelets     Status: Normal   Result Value Ref Range    WBC Count 4.6 4.0 - 11.0 10e3/uL    RBC Count 4.77 3.80 - 5.20 10e6/uL    Hemoglobin 13.7 11.7 - 15.7 g/dL    Hematocrit 43.0 35.0 - 47.0 %    MCV 90 78 - 100 fL    MCH 28.7 26.5 - 33.0 pg    MCHC 31.9 31.5 - 36.5 g/dL    RDW 13.0 10.0 - 15.0 %    Platelet Count 173 150 - 450 10e3/uL   Basic metabolic panel  (Ca, Cl, CO2, Creat, Gluc, K, Na, BUN)     Status: Normal   Result Value Ref Range    Sodium 140 135 - 145 mmol/L    Potassium 4.1 3.4 - 5.3 mmol/L    Chloride 105 98 - 107 mmol/L    Carbon Dioxide (CO2) 26 22 - 29 mmol/L    Anion Gap 9 7 - 15 mmol/L    Urea Nitrogen 15.3 6.0 - 20.0 mg/dL    Creatinine 0.76 0.51 - 0.95 mg/dL    GFR Estimate >90 >60 mL/min/1.73m2    Calcium 9.3 8.6 - 10.0 mg/dL    Glucose 81 70 - 99 mg/dL        No EKG required, no history of coronary heart disease, significant arrhythmia, peripheral arterial disease or other structural heart disease.    Revised Cardiac Risk Index (RCRI)  The patient has the following serious cardiovascular risks for perioperative complications:   - No serious cardiac risks = 0 points     RCRI Interpretation: 0 points: Class I (very low risk - 0.4% complication rate)         Signed Electronically by: Sia Garcia PA-C  Copy of this evaluation report is provided to requesting physician.

## 2024-01-20 LAB
ANION GAP SERPL CALCULATED.3IONS-SCNC: 9 MMOL/L (ref 7–15)
BUN SERPL-MCNC: 15.3 MG/DL (ref 6–20)
CALCIUM SERPL-MCNC: 9.3 MG/DL (ref 8.6–10)
CHLORIDE SERPL-SCNC: 105 MMOL/L (ref 98–107)
CREAT SERPL-MCNC: 0.76 MG/DL (ref 0.51–0.95)
DEPRECATED HCO3 PLAS-SCNC: 26 MMOL/L (ref 22–29)
EGFRCR SERPLBLD CKD-EPI 2021: >90 ML/MIN/1.73M2
GLUCOSE SERPL-MCNC: 81 MG/DL (ref 70–99)
POTASSIUM SERPL-SCNC: 4.1 MMOL/L (ref 3.4–5.3)
SODIUM SERPL-SCNC: 140 MMOL/L (ref 135–145)

## 2024-01-25 ENCOUNTER — LAB (OUTPATIENT)
Dept: LAB | Facility: CLINIC | Age: 49
End: 2024-01-25
Payer: COMMERCIAL

## 2024-01-25 DIAGNOSIS — S83.241D ACUTE MEDIAL MENISCUS TEAR OF RIGHT KNEE, SUBSEQUENT ENCOUNTER: ICD-10-CM

## 2024-01-25 DIAGNOSIS — Z01.818 PREOP GENERAL PHYSICAL EXAM: ICD-10-CM

## 2024-01-25 DIAGNOSIS — R00.0 TACHYCARDIA: ICD-10-CM

## 2024-01-25 LAB — HCG UR QL: NEGATIVE

## 2024-01-25 PROCEDURE — 81025 URINE PREGNANCY TEST: CPT

## 2024-02-04 ASSESSMENT — ACTIVITIES OF DAILY LIVING (ADL)
WALK: I AM UNABLE TO DO THE ACTIVITY
AS_A_RESULT_OF_YOUR_KNEE_INJURY,_HOW_WOULD_YOU_RATE_YOUR_CURRENT_LEVEL_OF_DAILY_ACTIVITY?: SEVERELY ABNORMAL
STAND: I AM UNABLE TO DO THE ACTIVITY
SWELLING: THE SYMPTOM AFFECTS MY ACTIVITY SLIGHTLY
GO UP STAIRS: I AM UNABLE TO DO THE ACTIVITY
HOW_WOULD_YOU_RATE_THE_OVERALL_FUNCTION_OF_YOUR_KNEE_DURING_YOUR_USUAL_DAILY_ACTIVITIES?: SEVERELY ABNORMAL
WALK: I AM UNABLE TO DO THE ACTIVITY
STIFFNESS: THE SYMPTOM AFFECTS MY ACTIVITY MODERATELY
KNEE_ACTIVITY_OF_DAILY_LIVING_SCORE: 27.14
KNEEL ON THE FRONT OF YOUR KNEE: I AM UNABLE TO DO THE ACTIVITY
SIT WITH YOUR KNEE BENT: I AM UNABLE TO DO THE ACTIVITY
HOW_WOULD_YOU_RATE_THE_CURRENT_FUNCTION_OF_YOUR_KNEE_DURING_YOUR_USUAL_DAILY_ACTIVITIES_ON_A_SCALE_FROM_0_TO_100_WITH_100_BEING_YOUR_LEVEL_OF_KNEE_FUNCTION_PRIOR_TO_YOUR_INJURY_AND_0_BEING_THE_INABILITY_TO_PERFORM_ANY_OF_YOUR_USUAL_DAILY_ACTIVITIES?: 0
GIVING WAY, BUCKLING OR SHIFTING OF KNEE: I DO NOT HAVE THE SYMPTOM
RAW_SCORE: 19
RISE FROM A CHAIR: I AM UNABLE TO DO THE ACTIVITY
RISE FROM A CHAIR: I AM UNABLE TO DO THE ACTIVITY
KNEEL ON THE FRONT OF YOUR KNEE: I AM UNABLE TO DO THE ACTIVITY
WEAKNESS: THE SYMPTOM AFFECTS MY ACTIVITY SLIGHTLY
GO DOWN STAIRS: I AM UNABLE TO DO THE ACTIVITY
WEAKNESS: THE SYMPTOM AFFECTS MY ACTIVITY SLIGHTLY
HOW_WOULD_YOU_RATE_THE_CURRENT_FUNCTION_OF_YOUR_KNEE_DURING_YOUR_USUAL_DAILY_ACTIVITIES_ON_A_SCALE_FROM_0_TO_100_WITH_100_BEING_YOUR_LEVEL_OF_KNEE_FUNCTION_PRIOR_TO_YOUR_INJURY_AND_0_BEING_THE_INABILITY_TO_PERFORM_ANY_OF_YOUR_USUAL_DAILY_ACTIVITIES?: 0
HOW_WOULD_YOU_RATE_THE_OVERALL_FUNCTION_OF_YOUR_KNEE_DURING_YOUR_USUAL_DAILY_ACTIVITIES?: SEVERELY ABNORMAL
SIT WITH YOUR KNEE BENT: I AM UNABLE TO DO THE ACTIVITY
SWELLING: THE SYMPTOM AFFECTS MY ACTIVITY SLIGHTLY
GO DOWN STAIRS: I AM UNABLE TO DO THE ACTIVITY
STIFFNESS: THE SYMPTOM AFFECTS MY ACTIVITY MODERATELY
LIMPING: THE SYMPTOM AFFECTS MY ACTIVITY SLIGHTLY
PAIN: THE SYMPTOM AFFECTS MY ACTIVITY SLIGHTLY
GO UP STAIRS: I AM UNABLE TO DO THE ACTIVITY
AS_A_RESULT_OF_YOUR_KNEE_INJURY,_HOW_WOULD_YOU_RATE_YOUR_CURRENT_LEVEL_OF_DAILY_ACTIVITY?: SEVERELY ABNORMAL
SQUAT: I AM UNABLE TO DO THE ACTIVITY
KNEE_ACTIVITY_OF_DAILY_LIVING_SUM: 19
GIVING WAY, BUCKLING OR SHIFTING OF KNEE: I DO NOT HAVE THE SYMPTOM
LIMPING: THE SYMPTOM AFFECTS MY ACTIVITY SLIGHTLY
PLEASE_INDICATE_YOR_PRIMARY_REASON_FOR_REFERRAL_TO_THERAPY:: KNEE
STAND: I AM UNABLE TO DO THE ACTIVITY
SQUAT: I AM UNABLE TO DO THE ACTIVITY
PAIN: THE SYMPTOM AFFECTS MY ACTIVITY SLIGHTLY

## 2024-02-05 ENCOUNTER — THERAPY VISIT (OUTPATIENT)
Dept: PHYSICAL THERAPY | Facility: CLINIC | Age: 49
End: 2024-02-05
Payer: COMMERCIAL

## 2024-02-05 ENCOUNTER — TRANSCRIBE ORDERS (OUTPATIENT)
Dept: OTHER | Age: 49
End: 2024-02-05

## 2024-02-05 DIAGNOSIS — M25.561 RIGHT KNEE PAIN: ICD-10-CM

## 2024-02-05 DIAGNOSIS — G89.18 ACUTE POSTOPERATIVE PAIN OF RIGHT KNEE: Primary | ICD-10-CM

## 2024-02-05 DIAGNOSIS — S83.249A MMT (MEDIAL MENISCUS TEAR): Primary | ICD-10-CM

## 2024-02-05 DIAGNOSIS — M25.561 ACUTE POSTOPERATIVE PAIN OF RIGHT KNEE: Primary | ICD-10-CM

## 2024-02-05 PROCEDURE — 97110 THERAPEUTIC EXERCISES: CPT | Mod: GP | Performed by: PHYSICAL THERAPIST

## 2024-02-05 PROCEDURE — 97161 PT EVAL LOW COMPLEX 20 MIN: CPT | Mod: GP | Performed by: PHYSICAL THERAPIST

## 2024-02-05 NOTE — PROGRESS NOTES
PHYSICAL THERAPY EVALUATION  Type of Visit: Evaluation  Jefferson is a 48 year old s/p R knee arthroscopy 1/29/24. Pain fairly well managed. Sleep is manageable. Worst pain 4/10, managing mostly with Tylenol.     NWB  See electronic medical record for Abuse and Falls Screening details.    Subjective       Presenting condition or subjective complaint: Post surgery right medial meniscus repair  Date of onset: 01/29/24    Relevant medical history: History of fractures   Dates & types of surgery: 1/20/2024 right knee arthroscopy with medial meniscus repair.    Prior diagnostic imaging/testing results: MRI; X-ray     Prior therapy history for the same diagnosis, illness or injury: No        Living Environment  Social support: With a significant other or spouse   Type of home: Multi-level   Stairs to enter the home: Yes 1 Is there a railing: No   Ramp: No   Stairs inside the home: Yes 15 Is there a railing: Yes   Help at home: Self Cares (home health aide/personal care attendant, family, etc); Home management tasks (cooking, cleaning); Medication and/or finances  Equipment owned: Crutches     Employment: Yes RN  Hobbies/Interests: Camping, fishing, hunting, hiking    Patient goals for therapy: I m currently non weight bearing until my follow up. This appointment is to work on range of motion.     Objective   KNEE EVALUATION    ROM:   (Degrees) Left AROM Right AROM*   Knee Flexion 140 degree 50 degree   Knee Extension 3 deg hyper ext Lacking 3 deg from neutral     KNEE GIRTH MEASURE:  R* 15cm above joint line- at joint line- 15cm below joint line:[id number]  L 15cm above joint line- at joint line- 15cm below joint line:47cm-34-33.5cm    STRENGTH:   SLR R: 3 degree lag      Assessment & Plan   CLINICAL IMPRESSIONS  Medical Diagnosis: s/p R knee arthroscopy, meniscus repair    Treatment Diagnosis: R knee pain s/p meniscus repair   Impression/Assessment: Patient is a 48 year old female with s/p R meniscus repair complaints.   The following significant findings have been identified: Pain, Decreased ROM/flexibility, Decreased joint mobility, Decreased strength, Decreased proprioception, Impaired sensation, Inflammation, Edema, Impaired gait, Impaired muscle performance, and Decreased activity tolerance. These impairments interfere with their ability to perform self care tasks, work tasks, recreational activities, household chores, driving , household mobility, and community mobility as compared to previous level of function.     Clinical Decision Making (Complexity):  Clinical Presentation: Stable/Uncomplicated  Clinical Presentation Rationale: based on medical and personal factors listed in PT evaluation  Clinical Decision Making (Complexity): Low complexity    PLAN OF CARE  Treatment Interventions:  Modalities: Cryotherapy, Dry Needling, E-stim  Interventions: Gait Training, Manual Therapy, Neuromuscular Re-education, Therapeutic Activity, Therapeutic Exercise, Self-Care/Home Management    Long Term Goals     PT Goal 1  Goal Description: Patient will demonstrate R knee ROM 0-120  Rationale: to maximize safety and independence with performance of ADLs and functional tasks;to maximize safety and independence within the home;to maximize safety and independence within the community;to maximize safety and independence with transportation;to maximize safety and independence with self cares  Target Date: 03/18/24  PT Goal 2  Goal Description: Patient will be able to ambulate without compensations  Rationale: to maximize safety and independence within the community;to maximize safety and independence with transportation  Target Date: 04/29/24  PT Goal 3  Goal Description: Patient will demonstrate >=80% LSI in quad strength  Rationale: to maximize safety and independence within the home;to maximize safety and independence within the community;to maximize safety and independence with performance of ADLs and functional tasks  Target Date:  04/29/24      Frequency of Treatment: 2x/week for 2-4 weeks, 1x/week 1 month +  Duration of Treatment: 12 weeks    Education Assessment:   Learner/Method: Patient;Significant Other  Education Comments: PTRx- Printed    Risks and benefits of evaluation/treatment have been explained.   Patient/Family/caregiver agrees with Plan of Care.     Evaluation Time:     PT Eval, Low Complexity Minutes (04804): 20    Signing Clinician: Raquel Jordan PT        [facility]                                                                                   OUTPATIENT PHYSICAL THERAPY      PLAN OF TREATMENT FOR OUTPATIENT REHABILITATION   Patient's Last Name, First Name, Jefferson Souza YOB: 1975   Provider's Name   [facility]   Medical Record No.  [id number]     Onset Date: 01/29/24  Start of Care Date:       Medical Diagnosis:  s/p R knee arthroscopy, meniscus repair      PT Treatment Diagnosis:  R knee pain s/p meniscus repair Plan of Treatment  Frequency/Duration: 2x/week for 2-4 weeks, 1x/week 1 month +/ 12 weeks    Certification date from 02/05/24 to 04/29/24         See note for plan of treatment details and functional goals     Raquel Jordan, PT                         I CERTIFY THE NEED FOR THESE SERVICES FURNISHED UNDER        THIS PLAN OF TREATMENT AND WHILE UNDER MY CARE .             Physician Signature               Date    X_____________________________________________________                  Referring Provider:  Sia Garcia    Initial Assessment  See Epic Evaluation-

## 2024-02-08 ENCOUNTER — THERAPY VISIT (OUTPATIENT)
Dept: PHYSICAL THERAPY | Facility: CLINIC | Age: 49
End: 2024-02-08
Payer: COMMERCIAL

## 2024-02-08 DIAGNOSIS — G89.18 ACUTE POSTOPERATIVE PAIN OF RIGHT KNEE: Primary | ICD-10-CM

## 2024-02-08 DIAGNOSIS — M25.561 ACUTE POSTOPERATIVE PAIN OF RIGHT KNEE: Primary | ICD-10-CM

## 2024-02-08 PROCEDURE — 97110 THERAPEUTIC EXERCISES: CPT | Mod: GP | Performed by: PHYSICAL THERAPIST

## 2024-02-12 ENCOUNTER — THERAPY VISIT (OUTPATIENT)
Dept: PHYSICAL THERAPY | Facility: CLINIC | Age: 49
End: 2024-02-12
Payer: COMMERCIAL

## 2024-02-12 DIAGNOSIS — G89.18 ACUTE POSTOPERATIVE PAIN OF RIGHT KNEE: Primary | ICD-10-CM

## 2024-02-12 DIAGNOSIS — M25.561 ACUTE POSTOPERATIVE PAIN OF RIGHT KNEE: Primary | ICD-10-CM

## 2024-02-12 PROCEDURE — 97112 NEUROMUSCULAR REEDUCATION: CPT | Mod: GP | Performed by: PHYSICAL THERAPIST

## 2024-02-12 PROCEDURE — 97110 THERAPEUTIC EXERCISES: CPT | Mod: GP | Performed by: PHYSICAL THERAPIST

## 2024-02-19 ENCOUNTER — THERAPY VISIT (OUTPATIENT)
Dept: PHYSICAL THERAPY | Facility: CLINIC | Age: 49
End: 2024-02-19
Payer: COMMERCIAL

## 2024-02-19 DIAGNOSIS — M25.561 ACUTE POSTOPERATIVE PAIN OF RIGHT KNEE: Primary | ICD-10-CM

## 2024-02-19 DIAGNOSIS — G89.18 ACUTE POSTOPERATIVE PAIN OF RIGHT KNEE: Primary | ICD-10-CM

## 2024-02-19 PROCEDURE — 97116 GAIT TRAINING THERAPY: CPT | Mod: GP | Performed by: PHYSICAL THERAPIST

## 2024-02-19 PROCEDURE — 97112 NEUROMUSCULAR REEDUCATION: CPT | Mod: GP | Performed by: PHYSICAL THERAPIST

## 2024-02-19 PROCEDURE — 97110 THERAPEUTIC EXERCISES: CPT | Mod: GP | Performed by: PHYSICAL THERAPIST

## 2024-02-22 ENCOUNTER — THERAPY VISIT (OUTPATIENT)
Dept: PHYSICAL THERAPY | Facility: CLINIC | Age: 49
End: 2024-02-22
Payer: COMMERCIAL

## 2024-02-22 DIAGNOSIS — M25.561 ACUTE POSTOPERATIVE PAIN OF RIGHT KNEE: Primary | ICD-10-CM

## 2024-02-22 DIAGNOSIS — G89.18 ACUTE POSTOPERATIVE PAIN OF RIGHT KNEE: Primary | ICD-10-CM

## 2024-02-22 PROCEDURE — 97110 THERAPEUTIC EXERCISES: CPT | Mod: GP

## 2024-02-22 PROCEDURE — 97116 GAIT TRAINING THERAPY: CPT | Mod: GP

## 2024-02-26 ENCOUNTER — THERAPY VISIT (OUTPATIENT)
Dept: PHYSICAL THERAPY | Facility: CLINIC | Age: 49
End: 2024-02-26
Payer: COMMERCIAL

## 2024-02-26 DIAGNOSIS — M25.561 ACUTE POSTOPERATIVE PAIN OF RIGHT KNEE: Primary | ICD-10-CM

## 2024-02-26 DIAGNOSIS — G89.18 ACUTE POSTOPERATIVE PAIN OF RIGHT KNEE: Primary | ICD-10-CM

## 2024-02-26 PROCEDURE — 97116 GAIT TRAINING THERAPY: CPT | Mod: GP | Performed by: PHYSICAL THERAPIST

## 2024-02-26 PROCEDURE — 97110 THERAPEUTIC EXERCISES: CPT | Mod: GP | Performed by: PHYSICAL THERAPIST

## 2024-03-04 ENCOUNTER — THERAPY VISIT (OUTPATIENT)
Dept: PHYSICAL THERAPY | Facility: CLINIC | Age: 49
End: 2024-03-04
Payer: COMMERCIAL

## 2024-03-04 DIAGNOSIS — M25.561 ACUTE POSTOPERATIVE PAIN OF RIGHT KNEE: Primary | ICD-10-CM

## 2024-03-04 DIAGNOSIS — G89.18 ACUTE POSTOPERATIVE PAIN OF RIGHT KNEE: Primary | ICD-10-CM

## 2024-03-04 PROCEDURE — 97110 THERAPEUTIC EXERCISES: CPT | Mod: GP | Performed by: PHYSICAL THERAPIST

## 2024-03-11 ENCOUNTER — THERAPY VISIT (OUTPATIENT)
Dept: PHYSICAL THERAPY | Facility: CLINIC | Age: 49
End: 2024-03-11
Payer: COMMERCIAL

## 2024-03-11 DIAGNOSIS — M25.561 ACUTE POSTOPERATIVE PAIN OF RIGHT KNEE: Primary | ICD-10-CM

## 2024-03-11 DIAGNOSIS — G89.18 ACUTE POSTOPERATIVE PAIN OF RIGHT KNEE: Primary | ICD-10-CM

## 2024-03-11 PROCEDURE — 97110 THERAPEUTIC EXERCISES: CPT | Mod: GP | Performed by: PHYSICAL THERAPIST

## 2024-03-14 ENCOUNTER — TELEPHONE (OUTPATIENT)
Dept: FAMILY MEDICINE | Facility: CLINIC | Age: 49
End: 2024-03-14

## 2024-03-14 NOTE — TELEPHONE ENCOUNTER
Patient Quality Outreach    Patient is due for the following:   Breast Cancer Screening - Mammogram  Cervical Cancer Screening - PAP Needed  Depression  -  PHQ-9 needed  Physical Preventive Adult Physical      Type of outreach:    Sent [employer] message.    Patient has mammo 6/24  Questions for provider review:    None           Jesika Martinez MA  Chart routed to Care Team.

## 2024-03-14 NOTE — LETTER
April 16, 2024      Jefferson Tineo  [address]  [place] [address]    Your team at [facility] cares about your health. We have reviewed your chart and based on our findings; we are making the following recommendations to better manage your health.     You are in particular need of attention regarding the following:     Schedule a primary care office visit with your provider for a Pap Smear to screen for Cervical Cancer.  PREVENTATIVE VISIT: Physical    If you have already completed these items, please contact the clinic via phone or   [employer] so your care team can review and update your records. Thank you for   choosing [facility] for your healthcare needs. For any questions,   concerns, or to schedule an appointment please contact our clinic.    Healthy Regards,      Your [facility] Care Team

## 2024-03-18 ENCOUNTER — THERAPY VISIT (OUTPATIENT)
Dept: PHYSICAL THERAPY | Facility: CLINIC | Age: 49
End: 2024-03-18
Payer: COMMERCIAL

## 2024-03-18 DIAGNOSIS — G89.18 ACUTE POSTOPERATIVE PAIN OF RIGHT KNEE: Primary | ICD-10-CM

## 2024-03-18 DIAGNOSIS — M25.561 ACUTE POSTOPERATIVE PAIN OF RIGHT KNEE: Primary | ICD-10-CM

## 2024-03-18 PROCEDURE — 97116 GAIT TRAINING THERAPY: CPT | Mod: GP | Performed by: PHYSICAL THERAPIST

## 2024-03-18 PROCEDURE — 97112 NEUROMUSCULAR REEDUCATION: CPT | Mod: GP | Performed by: PHYSICAL THERAPIST

## 2024-03-18 PROCEDURE — 97110 THERAPEUTIC EXERCISES: CPT | Mod: GP | Performed by: PHYSICAL THERAPIST

## 2024-04-01 ENCOUNTER — THERAPY VISIT (OUTPATIENT)
Dept: PHYSICAL THERAPY | Facility: CLINIC | Age: 49
End: 2024-04-01
Payer: COMMERCIAL

## 2024-04-01 DIAGNOSIS — G89.18 ACUTE POSTOPERATIVE PAIN OF RIGHT KNEE: Primary | ICD-10-CM

## 2024-04-01 DIAGNOSIS — M25.561 ACUTE POSTOPERATIVE PAIN OF RIGHT KNEE: Primary | ICD-10-CM

## 2024-04-01 PROCEDURE — 97112 NEUROMUSCULAR REEDUCATION: CPT | Mod: GP | Performed by: PHYSICAL THERAPIST

## 2024-04-01 PROCEDURE — 97116 GAIT TRAINING THERAPY: CPT | Mod: GP | Performed by: PHYSICAL THERAPIST

## 2024-04-01 PROCEDURE — 97110 THERAPEUTIC EXERCISES: CPT | Mod: GP | Performed by: PHYSICAL THERAPIST

## 2024-04-01 NOTE — PROGRESS NOTES
"SUBJECTIVE  Treatment Dx: R knee pain s/p meniscus repair  Certification date from:  02/05/24 2/5/2024 to 04/29/244/29/2024    Visit #:  10/12    Saw the MD. Has discontinued the brace since seeing the MD. Have been mobilizing and manipulating the knee, which has helped to improve my motion it seems.     OBJECTIVE  R knee ROM 0-97      THERAPEUTIC EXERCISES- direction supervision and instruction provided to carry out therapeutic exercises, with the intention to improve strength and range of motion.     *Upright bike x 5 minutes, no resistance for ROM x 5 minutes  *Contract-relax, EOB for knee flexion, 3 rounds  *DL leg press R LE bias, with intermittent superior glides to facilitate quad activity x #3, x10    NEURO RE-ED- one on one supervision and cuing required to help restore balance, coordination, kinesthetic sense and proprioception.    *Triple joint flex 4\" , 5\"-10\"x10, difficulty with quad activation due to inferior pole discomfort  *LAQ with adduction + NMES 5\" x15, use of ankle wt for HEP    GAIT- direct instruction provided to improve gait deviations.     *Retro gait with NMES to facilitate quad activation and improve R LE weight bearing/shifting, cues for stride length, stance time, heel contact, heel toe weight distribution through entire foot ; trialed single crutch in R UE to encourage WB through R LE      ASSESSMENT  Patient continues to demo limited functional CKC quad strength. NMES was continued but may benefit from BFR and increasing session frequency to 2x/week to help improve functional activity of the R quad. Encouraged to work through PF mobilizations and scar tissue work at home to further enhance superior glide of R PF joint.     PLAN  2x/week with possible trial of BFR, progressive quad strengthening, ROM     Goals     PT Goal 1  Goal Description: Patient will demonstrate R knee ROM 0-120  Rationale: to maximize safety and independence with performance of ADLs and functional tasks;to " maximize safety and independence within the home;to maximize safety and independence within the community;to maximize safety and independence with transportation;to maximize safety and independence with self cares  Goal Progress: 0-90  Target Date: 03/18/24  PT Goal 2  Goal Description: Patient will be able to ambulate without compensations  Rationale: to maximize safety and independence within the community;to maximize safety and independence with transportation  Target Date: 04/29/24  PT Goal 3  Goal Description: Patient will demonstrate >=80% LSI in quad strength  Rationale: to maximize safety and independence within the home;to maximize safety and independence within the community;to maximize safety and independence with performance of ADLs and functional tasks  Target Date: 04/29/24

## 2024-04-04 ENCOUNTER — OFFICE VISIT (OUTPATIENT)
Dept: FAMILY MEDICINE | Facility: CLINIC | Age: 49
End: 2024-04-04
Payer: COMMERCIAL

## 2024-04-04 ENCOUNTER — ANCILLARY PROCEDURE (OUTPATIENT)
Dept: ULTRASOUND IMAGING | Facility: CLINIC | Age: 49
End: 2024-04-04
Attending: PHYSICIAN ASSISTANT
Payer: COMMERCIAL

## 2024-04-04 VITALS
HEIGHT: 64 IN | WEIGHT: 137.8 LBS | HEART RATE: 130 BPM | DIASTOLIC BLOOD PRESSURE: 70 MMHG | SYSTOLIC BLOOD PRESSURE: 110 MMHG | TEMPERATURE: 98.4 F | OXYGEN SATURATION: 98 % | BODY MASS INDEX: 23.52 KG/M2 | RESPIRATION RATE: 20 BRPM

## 2024-04-04 DIAGNOSIS — Z98.890 S/P MEDIAL MENISCUS REPAIR OF RIGHT KNEE: ICD-10-CM

## 2024-04-04 DIAGNOSIS — L81.9 DISCOLORATION OF SKIN: ICD-10-CM

## 2024-04-04 DIAGNOSIS — M79.604 RIGHT LEG PAIN: Primary | ICD-10-CM

## 2024-04-04 DIAGNOSIS — R00.0 TACHYCARDIA: ICD-10-CM

## 2024-04-04 DIAGNOSIS — M79.604 RIGHT LEG PAIN: ICD-10-CM

## 2024-04-04 PROCEDURE — 99214 OFFICE O/P EST MOD 30 MIN: CPT | Mod: 25 | Performed by: PHYSICIAN ASSISTANT

## 2024-04-04 PROCEDURE — 93971 EXTREMITY STUDY: CPT | Mod: TC | Performed by: RADIOLOGY

## 2024-04-04 PROCEDURE — 93000 ELECTROCARDIOGRAM COMPLETE: CPT | Performed by: PHYSICIAN ASSISTANT

## 2024-04-04 ASSESSMENT — ENCOUNTER SYMPTOMS
WHEEZING: 0
NAUSEA: 0
COLOR CHANGE: 1
NUMBNESS: 0
SHORTNESS OF BREATH: 1
DIAPHORESIS: 0
COUGH: 0
WEAKNESS: 0
FEVER: 0
CHILLS: 0

## 2024-04-04 ASSESSMENT — PAIN SCALES - GENERAL: PAINLEVEL: MILD PAIN (2)

## 2024-04-04 NOTE — PROGRESS NOTES
Assessment & Plan     Right leg pain  S/P medial meniscus repair of right knee  Discoloration of skin  Tachycardia  Patient is a 48 YOF who is s/p right acute meniscus repair 1/29/2024 who presents to clinic due to persistent heaviness, pain, swelling, and discoloration to right lower leg with intermittent cramping of toes. Symptoms have had an insidious onset, no known injury, and have slowly worsened. Patient also notes some BECKER without chest pain or cough. Vital signs initially significant for tachycardia which resolves during clinic visit. Physical exam with edema, rapid color changes, and tenderness.     Low signs of DVT/PE: EKG completed and without signs of right heart strain. STAT US completed and negative for DVT.    Low suspicion for arterial occlusion as no pallor present, DP pulses 2+, and adequate capillary refill present.     Recommended labs to ensure no signs of vasculitis or inflammatory etiology.     Symptoms are concerning for complex regional pain syndrome. If labs without concerning findings will treat for CRPS with NSAID's, gabapentin, topical pain relievers, and ongoing PT. Will also refer to neurology at that time. Recommended follow up with orthopedics for ongoing care.   - US Lower Extremity Venous Duplex Right; Future  - EKG 12-lead complete w/read - Clinics  -BMP  -CBC  -CRP  -ESR      36 minutes spent by me on the date of the encounter doing chart review, history and exam, documentation and further activities per the note        See Patient Instructions    Bren Paulino is a 48 year old, presenting for the following health issues:  Musculoskeletal Problem        4/4/2024    11:17 AM   Additional Questions   Roomed by Mayte BALLESTEROS MA   Accompanied by No one         4/4/2024    11:17 AM   Patient Reported Additional Medications   Patient reports taking the following new medications None     History of Present Illness       Reason for visit:  Pain,swelling  Symptom onset:  1-2 weeks  "ago  Symptoms include:  Pain, swelling and discoloration  Symptom intensity:  Moderate  Symptom progression:  Staying the same  Had these symptoms before:  No  What makes it worse:  Standing walking bending knee  What makes it better:  Elevation    She eats 2-3 servings of fruits and vegetables daily.She consumes 2 sweetened beverage(s) daily.She exercises with enough effort to increase her heart rate 9 or less minutes per day.  She exercises with enough effort to increase her heart rate 3 or less days per week.   She is taking medications regularly.     Patient notes that since March 16th the right knee has felt full and heavy in the back when patient became more aggressive with ROM following surgery.   At that time she had 30 degrees of flexion and now has progressed to 90 degrees. Since surgery and they have become quite painful.  She first noticed the color change early March. Right foot will be come red, purple, and cold. Color will improve with elevation, but toes will cramp and become painful to bend which can occur when foot is up or down. No pallor. Bottom of foot hurts with walking.  Leg and foot will rapidly change color.  Patient notes BECKER that is slowly becoming more noticeable. No chest pain, cough.     Per chart review, patient s/p right acute meniscus repair 1/29/2024.  Patient has been following with physical therapy.      Review of Systems   Constitutional:  Negative for chills, diaphoresis and fever.   Respiratory:  Positive for shortness of breath. Negative for cough and wheezing.    Cardiovascular:  Negative for chest pain.   Gastrointestinal:  Negative for nausea.   Skin:  Positive for color change. Negative for pallor.   Neurological:  Negative for weakness and numbness.           Objective    /70   Pulse (!) 130   Temp 98.4  F (36.9  C) (Temporal)   Resp 20   Ht 1.626 m (5' 4\")   Wt 62.5 kg (137 lb 12.8 oz)   LMP  (LMP Unknown)   SpO2 98%   Breastfeeding No   BMI 23.65 kg/m  "   Body mass index is 23.65 kg/m .  Physical Exam  Vitals and nursing note reviewed.   Constitutional:       General: She is not in acute distress.     Appearance: Normal appearance.   HENT:      Head: Normocephalic and atraumatic.      Mouth/Throat:      Mouth: Mucous membranes are moist.      Pharynx: Oropharynx is clear.   Eyes:      Extraocular Movements: Extraocular movements intact.      Pupils: Pupils are equal, round, and reactive to light.   Cardiovascular:      Rate and Rhythm: Normal rate and regular rhythm.      Heart sounds: Normal heart sounds.   Pulmonary:      Effort: Pulmonary effort is normal.      Breath sounds: Normal breath sounds.   Musculoskeletal:         General: Normal range of motion.      Cervical back: Normal range of motion.      Comments: RLE: Rapid color transition with dusky erythema/ecchymotic appearance of right lower extremity progressing in intensity towards foot and digits.  Tenderness to palpation of right calf, anterior aspect overlying tibia, ankle, and foot.      Adequate capillary refill.  DP pulses 2+ bilaterally.  Cool sensation equal to touch bilaterally, but subjectively colder on right.    LLE: Faint dusky erythema to digits   Skin:     General: Skin is warm and dry.   Neurological:      General: No focal deficit present.      Mental Status: She is alert.   Psychiatric:         Mood and Affect: Mood normal.         Behavior: Behavior normal.                    EKG - Reviewed and interpreted by me appears normal, NSR, Rate: 95, normal axis, normal intervals, no acute ST/T changes c/w ischemia, no LVH by voltage criteria    STAT US LOWER EXTREMITY RIGHT:  IMPRESSION: Negative for DVT in the right lower extremity.         Signed Electronically by: Sia Garcia PA-C

## 2024-04-05 ENCOUNTER — TELEPHONE (OUTPATIENT)
Dept: FAMILY MEDICINE | Facility: CLINIC | Age: 49
End: 2024-04-05
Payer: COMMERCIAL

## 2024-04-05 DIAGNOSIS — M79.604 RIGHT LEG PAIN: Primary | ICD-10-CM

## 2024-04-05 RX ORDER — GABAPENTIN 100 MG/1
100 CAPSULE ORAL AT BEDTIME
Qty: 90 CAPSULE | Refills: 0 | Status: SHIPPED | OUTPATIENT
Start: 2024-04-05

## 2024-04-05 RX ORDER — CAPSAICIN 0.025 %
1 CREAM (GRAM) TOPICAL 3 TIMES DAILY PRN
Qty: 118 ML | Refills: 1 | Status: SHIPPED | OUTPATIENT
Start: 2024-04-05

## 2024-04-05 NOTE — PATIENT INSTRUCTIONS
For evaluation of the pain and color changes of your right lower leg we completed an EKG which did not show any worrisome heart rhythms.  We also completed an ultrasound which did not show signs of blood clots.  For next steps we are going to complete some blood work to look for signs of inflammation.  If your labs are normal, we may consider a trial of gabapentin, topical pain relievers, and ibuprofen for management as well as a possible referral to neurology.  Please reach out with questions or concerns.

## 2024-04-05 NOTE — TELEPHONE ENCOUNTER
Called patient regarding next steps.  Recommended completing labs to ensure no underlying inflammatory abnormalities.    Given concerns for CRPS, we will go ahead and start patient on gabapentin, topical capsaicin.  Recommended ibuprofen as needed-take with food.  Recommended continuing physical therapy.  Will place referral to neurology for further evaluation.    Sia Garcia PA-C on 4/5/2024 at 1:42 PM

## 2024-04-08 ENCOUNTER — THERAPY VISIT (OUTPATIENT)
Dept: PHYSICAL THERAPY | Facility: CLINIC | Age: 49
End: 2024-04-08
Payer: COMMERCIAL

## 2024-04-08 DIAGNOSIS — M25.561 ACUTE POSTOPERATIVE PAIN OF RIGHT KNEE: Primary | ICD-10-CM

## 2024-04-08 DIAGNOSIS — G89.18 ACUTE POSTOPERATIVE PAIN OF RIGHT KNEE: Primary | ICD-10-CM

## 2024-04-08 PROCEDURE — 97112 NEUROMUSCULAR REEDUCATION: CPT | Mod: GP | Performed by: PHYSICAL THERAPIST

## 2024-04-08 PROCEDURE — 97110 THERAPEUTIC EXERCISES: CPT | Mod: GP | Performed by: PHYSICAL THERAPIST

## 2024-04-08 NOTE — PROGRESS NOTES
"SUBJECTIVE  Treatment Dx: R knee pain s/p meniscus repair  Certification date from:  02/05/24  to 04/29/24  Visit #:  11/12    Wondering if I am dealing with CRPS. Seeing change in colors in the foot and through the leg. Had an US, was negative. Sharp pain to medial ankle.     OBJECTIVE  + Tarsal tunnel/Tinels    THERAPEUTIC EXERCISES- direction supervision and instruction provided to carry out therapeutic exercises, with the intention to improve strength and range of motion.     Sciatic nerve glide seated x 15,  Ret flossing x 10   Discussion regarding nerve desensitization techniques   DL leg press R LE bias, with intermittent superior glides to facilitate quad activity x #3, to fatigue; with NMES  DL leg press mid way hold 10\" x fatigue, with NMES  TFL soft tissue mobilizations, educated on self completion.     NOT PERFORMED  Upright bike x 5 minutes, no resistance for ROM x 5 minutes  Contract-relax, EOB for knee flexion, 3 rounds      NEURO RE-ED- one on one supervision and cuing required to help restore balance, coordination, kinesthetic sense and proprioception.    Triple joint flex 6\" , 5\"-10\"x10, with NMES   McConnel taping to help improve medial PF positioning, improve quad activity   Discussion regarding methods for nerve desensitization with fabric    GAIT- direct instruction provided to improve gait deviations.     NOT COMPLETED:  Retro gait with NMES to facilitate quad activation and improve R LE weight bearing/shifting, cues for stride length, stance time, heel contact, heel toe weight distribution through entire foot ; trialed single crutch in R UE to encourage WB through R LE      ASSESSMENT  Patient demonstrating increased neural sensitivity more distally to R LE. Worked through nerve gliding methods and reviewed ways to help with desensitization. Improvements in quad activity noted with McConnel taping, as this helped to reduce lateral tilt of PF joint . Will continue as we see " benefit.    PLAN  2x/week with possible trial of BFR, progressive quad strengthening, ROM ; re-cert    Goals     PT Goal 1  Goal Description: Patient will demonstrate R knee ROM 0-120  Rationale: to maximize safety and independence with performance of ADLs and functional tasks;to maximize safety and independence within the home;to maximize safety and independence within the community;to maximize safety and independence with transportation;to maximize safety and independence with self cares  Goal Progress: 0-90  Target Date: 03/18/24  PT Goal 2  Goal Description: Patient will be able to ambulate without compensations  Rationale: to maximize safety and independence within the community;to maximize safety and independence with transportation  Target Date: 04/29/24  PT Goal 3  Goal Description: Patient will demonstrate >=80% LSI in quad strength  Rationale: to maximize safety and independence within the home;to maximize safety and independence within the community;to maximize safety and independence with performance of ADLs and functional tasks  Target Date: 04/29/24

## 2024-04-15 ENCOUNTER — THERAPY VISIT (OUTPATIENT)
Dept: PHYSICAL THERAPY | Facility: CLINIC | Age: 49
End: 2024-04-15
Payer: COMMERCIAL

## 2024-04-15 DIAGNOSIS — G89.18 ACUTE POSTOPERATIVE PAIN OF RIGHT KNEE: Primary | ICD-10-CM

## 2024-04-15 DIAGNOSIS — M25.561 ACUTE POSTOPERATIVE PAIN OF RIGHT KNEE: Primary | ICD-10-CM

## 2024-04-15 PROCEDURE — 97110 THERAPEUTIC EXERCISES: CPT | Mod: GP | Performed by: PHYSICAL THERAPIST

## 2024-04-15 NOTE — PROGRESS NOTES
"SUBJECTIVE  Treatment Dx: R knee pain s/p meniscus repair  Certification date from:  02/05/24  to 04/29/24  Visit #:  12/24 (+12 4/15/2024)    Feeling about the same. The [place] taping was helpful for a few hours after but then not as much. Every time I try and push it, feels like the knee hurts more. Had an episode where I woke up at 1 AM the other morning, and the foot felt like it was burning (no heat or cold sensations felt from it though)    OBJECTIVE  R knee ROM: 0-95      THERAPEUTIC EXERCISES- direction supervision and instruction provided to carry out therapeutic exercises, with the intention to improve strength and range of motion.     Upright bike x 5 minutes, no resistance for ROM x 5 minutes  TFL/ITB stretch 2x30\", R  TFL soft tissue mobilizations x 5 minutes  Ret flossing x 10   Sciatic nerve glide seated x 15- VR  DL leg press mid way hold, #3, 10\" x fatigue, with NMES  Isometric LAQ 10\" x fatigue, with NMES    NOT PERFORMED  Contract-relax, EOB for knee flexion, 3 rounds      NEURO RE-ED- one on one supervision and cuing required to help restore balance, coordination, kinesthetic sense and proprioception.    NP: Triple joint flex 6\" , 5\"-10\"x10, with NMES      ASSESSMENT  Subtle changes in R knee flexion ROM but continues to demo functional limitations for 2.5 months post op. Patient with minimal change in distal limb discomfort but is tolerating desensitization techniques. Quad inhibition and weakness still present as well, leading to requiring use of single point crutch for ambulation. Patient to follow up with MD prior to arriving to next visit. PT continues to be indicated and appropriate at this time given the ongoing functional limitations.     PLAN  2x/week with possible trial of BFR, progressive quad strengthening, ROM       [facility]                                                                                   OUTPATIENT PHYSICAL THERAPY    PLAN OF " TREATMENT FOR OUTPATIENT REHABILITATION   Patient's Last Name, First Name, EDITHKayceBARTOLOMEKayce  Jefferson Tineo YOB: 1975   Provider's Name   [facility]   Medical Record No.  [id number]     Onset Date: 01/29/24  Start of Care Date: 02/05/24     Medical Diagnosis:  s/p R knee arthroscopy, meniscus repair      PT Treatment Diagnosis:  R knee pain s/p meniscus repair Plan of Treatment  Frequency/Duration: 2x/week for 2-4 weeks, 1x/week 1 month +/ 12 weeks    Certification date from 02/05/24 to 04/29/24         See note for plan of treatment details and functional goals     Raquel Jordan, PT                         I CERTIFY THE NEED FOR THESE SERVICES FURNISHED UNDER        THIS PLAN OF TREATMENT AND WHILE UNDER MY CARE     (Physician attestation of this document indicates review and certification of the therapy plan).              Referring Provider:  Du Bunch MD    Initial Assessment  See Epic Evaluation- Start of Care Date: 02/05/24

## 2024-04-16 NOTE — TELEPHONE ENCOUNTER
Patient Quality Outreach    Type of outreach:    Sent letter.    Next Steps:  Reach out within 90 days via Phone, MyChart, and Letter.    Max number of attempts reached: No. Will try again in 90 days if patient still on fail list.        Jesika Martinez MA  Chart routed to Care Team.

## 2024-04-22 ENCOUNTER — THERAPY VISIT (OUTPATIENT)
Dept: PHYSICAL THERAPY | Facility: CLINIC | Age: 49
End: 2024-04-22
Payer: COMMERCIAL

## 2024-04-22 DIAGNOSIS — G89.18 ACUTE POSTOPERATIVE PAIN OF RIGHT KNEE: Primary | ICD-10-CM

## 2024-04-22 DIAGNOSIS — M25.561 ACUTE POSTOPERATIVE PAIN OF RIGHT KNEE: Primary | ICD-10-CM

## 2024-04-22 PROCEDURE — 97112 NEUROMUSCULAR REEDUCATION: CPT | Mod: GP | Performed by: PHYSICAL THERAPIST

## 2024-04-22 PROCEDURE — 97110 THERAPEUTIC EXERCISES: CPT | Mod: GP | Performed by: PHYSICAL THERAPIST

## 2024-04-22 NOTE — PROGRESS NOTES
"SUBJECTIVE  Treatment Dx: R knee pain s/p meniscus repair  Certification date from:  02/05/24  to 04/29/24  Visit #:  13/24    Saw Dr. Bunch, agrees that I have CRPS. Drove today and this going well.     OBJECTIVE  R knee ROM: 0-100    THERAPEUTIC EXERCISES- direction supervision and instruction provided to carry out therapeutic exercises, with the intention to improve strength and range of motion.     Upright bike x 8 minutes, no resistance for ROM, full revolution  TFL soft tissue mobilizations x 5 minutes  TFL/ITB stretch 2x30\", R  Ret flossing x 10   3 way kicks, RTB around the knees    NP: DL leg press mid way hold, #3, 10\" x fatigue, with NMES      NEURO RE-ED- one on one supervision and cuing required to help restore balance, coordination, kinesthetic sense and proprioception.    Isometric LAQ 10\" x 10-15, with NMES  LAQ #1, 5\"x3 with NMES, adduction , PF pain  Sit to stands RTB, NMES to improve glute engagement x15    ASSESSMENT  Incremental improvements in ROM. Pain reduction with CKC quad loading and cues for glute activation.    PLAN  2x/week with possible trial of BFR, progressive quad strengthening, ROM , may consider DN for ROM gains  "

## 2024-04-29 ENCOUNTER — THERAPY VISIT (OUTPATIENT)
Dept: PHYSICAL THERAPY | Facility: CLINIC | Age: 49
End: 2024-04-29
Payer: COMMERCIAL

## 2024-04-29 DIAGNOSIS — G89.18 ACUTE POSTOPERATIVE PAIN OF RIGHT KNEE: Primary | ICD-10-CM

## 2024-04-29 DIAGNOSIS — M25.561 ACUTE POSTOPERATIVE PAIN OF RIGHT KNEE: Primary | ICD-10-CM

## 2024-04-29 PROCEDURE — 97112 NEUROMUSCULAR REEDUCATION: CPT | Mod: GP | Performed by: PHYSICAL THERAPIST

## 2024-04-29 PROCEDURE — 97110 THERAPEUTIC EXERCISES: CPT | Mod: GP | Performed by: PHYSICAL THERAPIST

## 2024-04-29 NOTE — PROGRESS NOTES
"SUBJECTIVE  Treatment Dx: R knee pain s/p meniscus repair  Certification date from:  02/05/24  to 04/29/24  Visit #:  14/24    Was on my feet for like four hours. Felt good to continue to walk.     OBJECTIVE  R knee ROM: 0-105    THERAPEUTIC EXERCISES- direction supervision and instruction provided to carry out therapeutic exercises, with the intention to improve strength and range of motion.     Upright bike x 8 minutes, no resistance for ROM, full revolution  TFL soft tissue mobilizations x 10  minutes  Ret flossing x 10     NP:   3 way kicks, RTB around the knees  DL leg press mid way hold, #3, 10\" x fatigue, with NMES    NEURO RE-ED- one on one supervision and cuing required to help restore balance, coordination, kinesthetic sense and proprioception.    WITH NMES AND BFR (70% LOP, of 275mmHg):   BW LAQ 5\" x30 , 2x15  Sit to stands RTB, cues to  improve glute engagement x 30    ASSESSMENT  Subltle changes but improvements noted with knee flexion ROM- NMES + BFR trialed with good tolerance. Notable fatigue immediately following. Trial again Thursday as symptoms allow.     PLAN  2x/week with possible trial of BFR, progressive quad strengthening, ROM , may consider DN for ROM gains  "

## 2024-05-02 ENCOUNTER — THERAPY VISIT (OUTPATIENT)
Dept: PHYSICAL THERAPY | Facility: CLINIC | Age: 49
End: 2024-05-02
Payer: COMMERCIAL

## 2024-05-02 DIAGNOSIS — M25.561 ACUTE POSTOPERATIVE PAIN OF RIGHT KNEE: Primary | ICD-10-CM

## 2024-05-02 DIAGNOSIS — G89.18 ACUTE POSTOPERATIVE PAIN OF RIGHT KNEE: Primary | ICD-10-CM

## 2024-05-02 PROCEDURE — 97110 THERAPEUTIC EXERCISES: CPT | Mod: GP | Performed by: PHYSICAL THERAPIST

## 2024-05-02 NOTE — PROGRESS NOTES
"SUBJECTIVE  Treatment Dx: R knee pain s/p meniscus repair  Certification date from:  02/05/24  to 04/29/24  Visit #:  15/24    Overall the knee is feeling okay after BFR. Foot was a little upset but not sure if this is related to the prolonged standing I did that same day.     OBJECTIVE  R knee ROM: 0-108    THERAPEUTIC EXERCISES- direction supervision and instruction provided to carry out therapeutic exercises, with the intention to improve strength and range of motion.     Upright bike x 8 minutes, no resistance for ROM, full revolution  TFL soft tissue mobilizations x 5  minutes  Ret flossing x 10   WITH BFR (70% LOP, of 275mmHg):   LAQ isometrics with add at knee ext machine to fatigue   6\" triple joint flex holds 5\" to fatigue  Leg press DL #5 x20 with RTB, R LE bias  Side stepping RTB above knee fatigue     NP:   3 way kicks, RTB around the knees  DL leg press mid way hold, #3, 10\" x fatigue, with NMES    NP: Sit to stands RTB, cues to  improve glute engagement x 30    ASSESSMENT  BFR continued with fatigued achieved, no adverse events.     PLAN  2x/week with possible trial of BFR, progressive quad strengthening, ROM , may consider DN for ROM gains  "

## 2024-05-10 ENCOUNTER — THERAPY VISIT (OUTPATIENT)
Dept: PHYSICAL THERAPY | Facility: CLINIC | Age: 49
End: 2024-05-10
Payer: COMMERCIAL

## 2024-05-10 DIAGNOSIS — M25.561 ACUTE POSTOPERATIVE PAIN OF RIGHT KNEE: Primary | ICD-10-CM

## 2024-05-10 DIAGNOSIS — G89.18 ACUTE POSTOPERATIVE PAIN OF RIGHT KNEE: Primary | ICD-10-CM

## 2024-05-10 PROCEDURE — 97110 THERAPEUTIC EXERCISES: CPT | Mod: GP | Performed by: PHYSICAL THERAPIST

## 2024-05-10 NOTE — PROGRESS NOTES
"SUBJECTIVE  Treatment Dx: R knee pain s/p meniscus repair  Certification date from:  02/05/24  to 04/29/24  Visit #:  16/24    Minimal soreness after last visit. Trying to do the stairs and these seem to be going better.    OBJECTIVE  R knee ROM: 0-115    THERAPEUTIC EXERCISES- direction supervision and instruction provided to carry out therapeutic exercises, with the intention to improve strength and range of motion.     Upright bike x 5 minutes, no resistance for ROM, full revolution  TFL soft tissue mobilizations x 5  minutes  Bridges with marching, cues to maintain neutral pelvis x 10-15    WITH BFR (80% LOP, of 275mmHg):   Leg press DL #5 x20 with RTB, R LE bias  Side stepping GTB above knee fatigue   LSD 6\" -- discomfort the medial knee, requiring external cue for control of knee valgus/IR  Step ups 6\", with emphasis on eccentrics x fatigue, L UE only   Resisted retro gait #6 x fatigue      NP:   3 way kicks, RTB around the knees  Sit to stands RTB, cues to  improve glute engagement x 30  LAQ isometrics with add at knee ext machine to fatigue     ASSESSMENT  Progressing well with ROM. Fatigued with activities but no adverse events with BFR. Will benefit from focus on functional glute activation to maximize quad loading tolerance/activities.    PLAN  2x/week for BFR, progressive quad strengthening, ROM  -- may consider DN for ROM gains  "

## 2024-05-13 ENCOUNTER — THERAPY VISIT (OUTPATIENT)
Dept: PHYSICAL THERAPY | Facility: CLINIC | Age: 49
End: 2024-05-13
Payer: COMMERCIAL

## 2024-05-13 DIAGNOSIS — G89.18 ACUTE POSTOPERATIVE PAIN OF RIGHT KNEE: Primary | ICD-10-CM

## 2024-05-13 DIAGNOSIS — M25.561 ACUTE POSTOPERATIVE PAIN OF RIGHT KNEE: Primary | ICD-10-CM

## 2024-05-13 PROCEDURE — 97110 THERAPEUTIC EXERCISES: CPT | Mod: GP | Performed by: PHYSICAL THERAPIST

## 2024-05-13 NOTE — PROGRESS NOTES
"SUBJECTIVE  Treatment Dx:    Certification date from:     to    Visit #:  17/24    A little sore from working outside all weekend. The medial part of the knee hurts once in a while.     OBJECTIVE  R knee ROM: 0-120    THERAPEUTIC EXERCISES- direction supervision and instruction provided to carry out therapeutic exercises, with the intention to improve strength and range of motion.     Upright bike x 5 minutes, no resistance for ROM, full revolution  TFL soft tissue mobilizations x 5  minutes  Bridges with marching, cues to maintain neutral pelvis x 10-15  Side planks, modified 2x20-30\" each  Sit to stands with emphasis on eccentrics, R LE bias, x fatigue    WITH BFR (80% LOP, of 275mmHg):   Leg press DL #5 x20 with RTB, R LE bias  Side stepping GTB above knee fatigue   3 way kicks, GTB around the knees  Step ups 6\", with emphasis on eccentrics x fatigue, L UE only       NP:   LAQ isometrics with add at knee ext machine to fatigue   Resisted retro gait #6 x fatigue    ASSESSMENT  ROM goal achieved. Medial knee discomfort expected from functional glute and quad weakness. Quick fatigue with activities continued with BFR but beginning to see benefits, with improved tolerance to activities and strength.    PLAN  2x/week for BFR, progressive quad strengthening, ROM  -- may consider DN for ROM gains  "

## 2024-05-16 ENCOUNTER — THERAPY VISIT (OUTPATIENT)
Dept: PHYSICAL THERAPY | Facility: CLINIC | Age: 49
End: 2024-05-16
Payer: COMMERCIAL

## 2024-05-16 DIAGNOSIS — M25.561 ACUTE POSTOPERATIVE PAIN OF RIGHT KNEE: Primary | ICD-10-CM

## 2024-05-16 DIAGNOSIS — G89.18 ACUTE POSTOPERATIVE PAIN OF RIGHT KNEE: Primary | ICD-10-CM

## 2024-05-16 PROCEDURE — 97110 THERAPEUTIC EXERCISES: CPT | Mod: GP | Performed by: PHYSICAL THERAPIST

## 2024-05-16 NOTE — PROGRESS NOTES
"SUBJECTIVE  Treatment Dx: R knee pain s/p meniscus repair  Certification date from:  04/29/24  to 07/22/24  Visit #:  18/24    A little sore from Monday but manageable. Picked up a shift for in a couple weeks, 10 hour shifts.     OBJECTIVE  R knee ROM: 0-120    THERAPEUTIC EXERCISES- direction supervision and instruction provided to carry out therapeutic exercises, with the intention to improve strength and range of motion.     Upright bike x 5 minutes, no resistance for ROM, full revolution  TFL soft tissue mobilizations x 5  minutes  Bridges with marching, cues to maintain neutral pelvis x 15  Side planks, modified 2x20-30\" each      WITH BFR (80% LOP, of 275mmHg):   Sit to stands with emphasis on eccentrics, R LE bias, x fatigue (~15)  Lunge holds 10\" to fatigue, 2\" elevation   Leg press DL #6 x20 with RTB, R LE bias  Stride stance DL into march to fatigue      Not performed:   Side stepping GTB above knee fatigue   LAQ isometrics with add at knee ext machine to fatigue   Resisted retro gait #6 x fatigue  3 way kicks, GTB around the knees  Step ups 6\", with emphasis on eccentrics x fatigue, L UE only     ASSESSMENT  Fatigued with interventions, good tolerance to increase in resistance with activities. Still experiencing some PF discomfort due to lack of quad activity with R LE dominant activities.        PLAN  2x/week for BFR, progressive quad strengthening, ROM  -- may consider DN for ROM gains  "

## 2024-05-30 ENCOUNTER — THERAPY VISIT (OUTPATIENT)
Dept: PHYSICAL THERAPY | Facility: CLINIC | Age: 49
End: 2024-05-30
Payer: COMMERCIAL

## 2024-05-30 DIAGNOSIS — M25.561 ACUTE POSTOPERATIVE PAIN OF RIGHT KNEE: Primary | ICD-10-CM

## 2024-05-30 DIAGNOSIS — G89.18 ACUTE POSTOPERATIVE PAIN OF RIGHT KNEE: Primary | ICD-10-CM

## 2024-05-30 PROCEDURE — 97110 THERAPEUTIC EXERCISES: CPT | Mod: GP | Performed by: PHYSICAL THERAPIST

## 2024-05-30 NOTE — PROGRESS NOTES
"SUBJECTIVE  Treatment Dx: R knee pain s/p meniscus repair  Certification date from:  04/29/24  to 07/22/24  Visit #:  19/24    Going okay. Still time to time can feel the weakness and pain. Saw the MD, and he was glad CRPS is resolving but concerned about the strength of the quad.     OBJECTIVE  R knee ROM: 0-120  Moderate effusion     THERAPEUTIC EXERCISES- direction supervision and instruction provided to carry out therapeutic exercises, with the intention to improve strength and range of motion.     Upright bike x 5 minutes, lvl 4 resistance for ROM, full revolution  Lantigua taping for PF lateral to medial taping//KT taping for effusion    WITH BFR (80% LOP, of 275mmHg):   Leg press DL #6 x30, R LE bias, #3 iso SL presses 10\" x 5, x5   Sit to stands with emphasis on eccentrics, R LE bias, x10, x10  Stride stance DL into march to fatigue  Doming with GTB - glute squeezes 10\"      ASSESSMENT  Lantigua taping with no changes to PF discomfort with knee flexion activities. KT taping was trialed at end of session to help better manage effusion, as this is another main contributor to PF discomfort. Notable fatigue with loaded activities early on. Will continue to benefit from BFR to optimize strength of LQ.    PLAN  2x/week for BFR, progressive quad strengthening, ROM  -- may consider DN for ROM gains  "

## 2024-06-03 ENCOUNTER — THERAPY VISIT (OUTPATIENT)
Dept: PHYSICAL THERAPY | Facility: CLINIC | Age: 49
End: 2024-06-03
Payer: COMMERCIAL

## 2024-06-03 DIAGNOSIS — G89.18 ACUTE POSTOPERATIVE PAIN OF RIGHT KNEE: Primary | ICD-10-CM

## 2024-06-03 DIAGNOSIS — M25.561 ACUTE POSTOPERATIVE PAIN OF RIGHT KNEE: Primary | ICD-10-CM

## 2024-06-03 PROCEDURE — 97110 THERAPEUTIC EXERCISES: CPT | Mod: GP | Performed by: PHYSICAL THERAPIST

## 2024-06-03 NOTE — PROGRESS NOTES
"SUBJECTIVE  Treatment Dx: R knee pain s/p meniscus repair  Certification date from:  04/29/24  to 07/22/24  Visit #:  20/24    Swelling still present. The more I'm on it, the more it's tight and sore. Can notice the quad more with activities but does fatigue.     OBJECTIVE  R knee ROM: 0-120  Moderate effusion     THERAPEUTIC EXERCISES- direction supervision and instruction provided to carry out therapeutic exercises, with the intention to improve strength and range of motion.     Upright bike x 5 minutes, lvl 4 resistance for ROM, full revolution  KT taping for effusion  Resisted HS curls #3 x 10-15    WITH BFR (80% LOP, of 275mmHg):   Leg press DL #6 x25, R LE bias, #5 iso SL presses 10\" x 6  Standing marching with 10#, 3\"x15  Stride stance DL > SL DL into march up x 10  Resisted retro gait #9  x5    VR  Doming with GTB - glute squeezes 10\"- VR      ASSESSMENT  Patient fatigued post session. Discussed possible trial of orthotics in seeing if we can reduce medial knee discomfort. Good tolerance to activities otherwise.     PLAN  2x/week for BFR, progressive quad strengthening, ROM  "

## 2024-06-16 ENCOUNTER — HEALTH MAINTENANCE LETTER (OUTPATIENT)
Age: 49
End: 2024-06-16

## 2024-06-27 ENCOUNTER — THERAPY VISIT (OUTPATIENT)
Dept: PHYSICAL THERAPY | Facility: CLINIC | Age: 49
End: 2024-06-27
Payer: COMMERCIAL

## 2024-06-27 DIAGNOSIS — G89.18 ACUTE POSTOPERATIVE PAIN OF RIGHT KNEE: Primary | ICD-10-CM

## 2024-06-27 DIAGNOSIS — M25.561 ACUTE POSTOPERATIVE PAIN OF RIGHT KNEE: Primary | ICD-10-CM

## 2024-06-27 PROCEDURE — 20560 NDL INSJ W/O NJX 1 OR 2 MUSC: CPT | Mod: GA | Performed by: PHYSICAL THERAPIST

## 2024-06-27 PROCEDURE — 97110 THERAPEUTIC EXERCISES: CPT | Mod: GP | Performed by: PHYSICAL THERAPIST

## 2024-06-27 NOTE — PROGRESS NOTES
"SUBJECTIVE  Treatment Dx: R knee pain s/p meniscus repair  Certification date from:  04/29/24  to 07/22/24  Visit #:  21/36 (+12 as of 6/27/2024)    Had a R foot flare, CRPS.  [facility] thinking this would be helpful but it was sore. The knee is hit and miss. Still having trouble feeling the quad. MD follow up 7/10.    OBJECTIVE  TINDEQ   In pounds (kg) Left Right   Knee Extension 10.5/13.3/14.1kg 2.9/2.9/2.2kg   Mean of 3 12.6kg 2.67kg   LSI: 21%     THERAPEUTIC EXERCISES- direction supervision and instruction provided to carry out therapeutic exercises, with the intention to improve strength and range of motion.     Upright bike x 5 minutes, lvl 4 resistance for ROM, full revolution  Prone quad stretch 3x30\" R, 2 rounds  Tindeq testing    Not performed  WITH BFR (80% LOP, of 275mmHg):   Leg press DL #6 x25, R LE bias, #5 iso SL presses 10\" x 6  Standing marching with 10#, 3\"x15  Stride stance DL > SL DL into march up x 10  Resisted retro gait #9  x5  Not performed  Doming with GTB - glute squeezes 10\"- VR    Precautions and contraindications were cleared prior to the performance of dry needling. Potential risks were reviewed and consent was provided by the patient prior to initiating treatment.     DRY NEEDLING  Location: R quad, patellar tendon  # of needles: 4  Size: 0.00m82lg  Method: with e-stim  Position: sitting   Response: reduce stiffnes, improved knee flexion      ASSESSMENT  Ongoing limitations with quad strength revealed with Tindeq testing today. DN trialed to reduce scar tissue tension and quad restrictions and to help improve quad activity. Did improve in discomfort with knee flexion following. Will reassess and continue DN as indicated.    PLAN  DN PRN, progressive quad strengthening        [facility]                                                                                   OUTPATIENT PHYSICAL THERAPY    PLAN OF TREATMENT FOR " OUTPATIENT REHABILITATION   Patient's Last Name, First Name, Jefferson Souza YOB: 1975   Provider's Name   [facility]   Medical Record No.  [id number]     Onset Date: 01/29/24  Start of Care Date: 02/05/24     Medical Diagnosis:  s/p R knee arthroscopy, meniscus repair      PT Treatment Diagnosis:  R knee pain s/p meniscus repair Plan of Treatment  Frequency/Duration: 1-2x/week/ 12 weeks    Certification date from 06/27/24 to 09/19/24         See note for plan of treatment details and functional goals     Raquel Jordan, PT                         I CERTIFY THE NEED FOR THESE SERVICES FURNISHED UNDER        THIS PLAN OF TREATMENT AND WHILE UNDER MY CARE     (Physician attestation of this document indicates review and certification of the therapy plan).              Referring Provider:  Du Bunch    Initial Assessment  See Epic Evaluation- Start of Care Date: 02/05/24

## 2024-07-01 ENCOUNTER — THERAPY VISIT (OUTPATIENT)
Dept: PHYSICAL THERAPY | Facility: CLINIC | Age: 49
End: 2024-07-01
Payer: COMMERCIAL

## 2024-07-01 DIAGNOSIS — M25.561 ACUTE POSTOPERATIVE PAIN OF RIGHT KNEE: Primary | ICD-10-CM

## 2024-07-01 DIAGNOSIS — G89.18 ACUTE POSTOPERATIVE PAIN OF RIGHT KNEE: Primary | ICD-10-CM

## 2024-07-01 PROCEDURE — 97110 THERAPEUTIC EXERCISES: CPT | Mod: GP | Performed by: PHYSICAL THERAPIST

## 2024-07-01 NOTE — PROGRESS NOTES
"SUBJECTIVE  Treatment Dx: R knee pain s/p meniscus repair  Certification date from:  06/27/24  to 09/19/24  Visit #:  22/36 (+12 as of 6/27/2024)    Felt pretty good from the needling for the first day or two. Didn't have to think about going down the stairs as much. Back to it's normal state.      OBJECTIVE  Prior to mobs: Discomfort with descending stairs  Post manual: reduced discomfort    THERAPEUTIC EXERCISES- direction supervision and instruction provided to carry out therapeutic exercises, with the intention to improve strength and range of motion.     Upright bike x 5 minutes, lvl 4 resistance for ROM, full revolution  Prone quad stretch 3x30\" R, 2 rounds  Flex joint mobs with towel 3x30\"  Retro resisted gait  #8 x5  Primal plank holds 3x10-15\", fatigued in UE  Rearfoot elevated split lunge hold, anterior knee pain  Iso LAQ at wall - anterior knee pain    Not performed  WITH BFR (80% LOP, of 275mmHg):   Leg press DL #6 x25, R LE bias, #5 iso SL presses 10\" x 6  Standing marching with 10#, 3\"x15  Stride stance DL > SL DL into march up x 10  Doming with GTB - glute squeezes 10\"- VR      ASSESSMENT  Held off of DN. Responded well to manual only but continues to have struggles with sensing quad during there-ex. Can see activation of quad and demos good superior glide but sense of quad activity limited by anterior knee pain. Patient to follow up with MD for further consult. Will continue interventions as tolerated.    PLAN  DN PRN, progressive quad strengthening    "

## 2024-07-10 ENCOUNTER — ANCILLARY PROCEDURE (OUTPATIENT)
Dept: MAMMOGRAPHY | Facility: CLINIC | Age: 49
End: 2024-07-10
Attending: FAMILY MEDICINE
Payer: COMMERCIAL

## 2024-07-10 DIAGNOSIS — Z12.31 VISIT FOR SCREENING MAMMOGRAM: ICD-10-CM

## 2024-07-10 PROCEDURE — 77063 BREAST TOMOSYNTHESIS BI: CPT

## 2024-08-08 ENCOUNTER — LAB REQUISITION (OUTPATIENT)
Dept: LAB | Facility: CLINIC | Age: 49
End: 2024-08-08
Payer: COMMERCIAL

## 2024-08-08 DIAGNOSIS — Z12.4 ENCOUNTER FOR SCREENING FOR MALIGNANT NEOPLASM OF CERVIX: ICD-10-CM

## 2024-08-08 PROCEDURE — 87624 HPV HI-RISK TYP POOLED RSLT: CPT | Performed by: FAMILY MEDICINE

## 2024-08-08 PROCEDURE — G0145 SCR C/V CYTO,THINLAYER,RESCR: HCPCS | Performed by: FAMILY MEDICINE

## 2024-08-08 PROCEDURE — G0124 SCREEN C/V THIN LAYER BY MD: HCPCS | Performed by: PATHOLOGY

## 2024-08-11 LAB
HPV HR 12 DNA CVX QL NAA+PROBE: NEGATIVE
HPV16 DNA CVX QL NAA+PROBE: NEGATIVE
HPV18 DNA CVX QL NAA+PROBE: NEGATIVE
HUMAN PAPILLOMA VIRUS FINAL DIAGNOSIS: NORMAL

## 2024-08-14 LAB
BKR LAB AP GYN ADEQUACY: ABNORMAL
BKR LAB AP GYN INTERPRETATION: ABNORMAL
BKR LAB AP LMP: ABNORMAL
BKR LAB AP PREVIOUS ABNL DX: ABNORMAL
BKR LAB AP PREVIOUS ABNORMAL: ABNORMAL
PATH REPORT.COMMENTS IMP SPEC: ABNORMAL
PATH REPORT.COMMENTS IMP SPEC: ABNORMAL
PATH REPORT.RELEVANT HX SPEC: ABNORMAL

## 2024-09-16 NOTE — PROGRESS NOTES
DISCHARGE  Reason for Discharge: Patient chooses to discontinue therapy.    Equipment Issued: n/a    Discharge Plan: Patient to continue home program.    Referring Provider:  Du Bunch